# Patient Record
Sex: FEMALE | Race: WHITE | Employment: OTHER | URBAN - NONMETROPOLITAN AREA
[De-identification: names, ages, dates, MRNs, and addresses within clinical notes are randomized per-mention and may not be internally consistent; named-entity substitution may affect disease eponyms.]

---

## 2017-09-11 PROBLEM — I65.22 STENOSIS OF LEFT CAROTID ARTERY: Status: ACTIVE | Noted: 2017-09-11

## 2017-09-11 PROBLEM — Z72.0 TOBACCO ABUSE: Status: ACTIVE | Noted: 2017-09-11

## 2017-09-11 PROBLEM — I10 ESSENTIAL HYPERTENSION: Status: ACTIVE | Noted: 2017-09-11

## 2017-10-19 PROBLEM — Z98.890 S/P CAROTID ENDARTERECTOMY: Status: ACTIVE | Noted: 2017-10-19

## 2022-11-09 ENCOUNTER — HOSPITAL ENCOUNTER (OUTPATIENT)
Dept: PHYSICAL THERAPY | Age: 77
Setting detail: THERAPIES SERIES
Discharge: HOME | End: 2022-11-09
Payer: MEDICARE

## 2022-11-09 PROCEDURE — 97162 PT EVAL MOD COMPLEX 30 MIN: CPT

## 2022-11-09 NOTE — PROGRESS NOTES
** PLEASE SIGN, DATE AND TIME CERTIFICATION BELOW AND RETURN TO [facility]PAT[facility]ILITATION (FAX #: [phone]). ATTEST/CO-SIGN IF ACCESSING VIA [email or web address]. THANK YOU.**    I certify that I have examined the patient below and determined that Physical Medicine and Rehabilitation service is necessary and that I approve the established plan of care for up to 90 days or as specifically noted. Attestation, signature or co-signature of physician indicates approval of certification requirements.    ________________________ ____________ __________  Physician Signature   Date   Time  [address] [facility]  PHYSICAL THERAPY  [x] EVALUATION  [] DAILY NOTE (LAND) [] DAILY NOTE (AQUATIC ) [] PROGRESS NOTE [] DISCHARGE NOTE    [] [facility]   [] [facility] 90    [] [facility]   [x] Nusrat Kenneth    Date: 2022  Patient Name:  Brown Miller  : 1945  MRN: [id number]    Referring Practitioner Minerva Cisneros DO   Diagnosis Other spondylosis with myelopathy, cervical region [M47.12]    Treatment Diagnosis Cervical dysfunction    Date of Evaluation 22    Additional Pertinent History Diabetes, HBP,       Functional Outcome Measure Used Oswestry NDI   Functional Outcome Score 6 (22)       Insurance: Primary: Payor: Adryan Bachelor /  /  / ,   Secondary: HUMANA   Authorization Information: None    Visit # 1, 1/10 for progress note   Visits Allowed: Med nec   Recertification Date:    Physician Follow-Up: Dec 14, 2022    Physician Orders: Jerson Palmer and treat    History of Present Illness: Pain in the neck and for more than a year,  was progressively worsening--Surgery Junky 2022-- fusion due to 2 bulging discs      SUBJECTIVE: came 15 min late. Thought appt at 11:30     Social/Functional History and Current Status:  Medications and Allergies have been reviewed and are listed on Medical History Questionnaire.     Brown Miller lives with spouse in a multiple floor home with stairs and no handrail to enter. Prior to their onset of neck  issues, patient was I in all ADL's and functional activities   she had a lot of pain  Currently patient reports difficulty with lifting heavy weights. Patient is currently Retired  Still doing doing paperwork for [employer] OBJECTIVE:    Pain: Achy in right upper scap  and SO  better after surgery.         Observation/Posture Forward  shoulders, forward head     Scar well healed         Range of Motion CERVICAL RANGE OF MOTION  (% of normal )      Flexion 60    Extension 10   Rotation Right 50   Rotation Left 50   Sidebending Right 20   Sidebending Left 20           Cervical Range of Motion is [facility]  []         Strength Shoulder flex 4B  Abd  4- R, 4 L   Biceps 5 B  Triceps 5 B  Thumb add 4 B    Palpation    Sensation Normal to light touch                    Tip prehesion  More difficult with right hand occasionslly drops things   Special Tests    Reflexes  Biceps- slight B   Triceps- slight B   Brachioradialis- unable to elicit      TREATMENT   Precautions:    Pain:     X in shaded column indicates activity completed today   Modalities Parameters/  Location  Notes                     Manual Therapy Time/Technique  Notes                     Exercise/Intervention   Notes          Chin tucks, shoulder rolls   x Discussed posture                                                                     Specific Interventions Next Treatment: exercise, mainly stabs,  posture, modalities or manual therapy if pain increases     Activity/Treatment Tolerance:  [x]  Patient tolerated treatment well  []  Patient limited by fatigue  []  Patient limited by pain   []  Patient limited by medical complications  []  Other:     Assessment: mild weakness, major postural faults, and posture decreased   Body Structures/Functions/Activity Limitations: impaired endurance, impaired ROM, impaired strength, and abnormal posture  Prognosis: good    GOALS:  Patient Goal: less stiffness, better motin. Short and Long Term Goals:  Time Frame: 5 weeks   Decrease pain to less than a 2   Increase CROM to 50% of normal, except ext 10%   Increase strength of the both shoulders and scap muscle to 5/5   Improve posture   I HEP       Patient Education:   [x]  HEP/Education Completed: Plan of Care, Goals, posture  Medbridge Access Code:  []  No new Education completed  []  Reviewed Prior HEP      []  Patient verbalized and/or demonstrated understanding of education provided. []  Patient unable to verbalize and/or demonstrate understanding of education provided. Will continue education. []  Barriers to learning: non    PLAN:  Treatment Recommendations: Strengthening, Range of Motion, Manual Therapy - Soft Tissue Mobilization, Manual Therapy - Joint Manipulation, Pain Management, Home Exercise Program, Patient Education, Positioning, and Modalities    [x]  Plan of care initiated. Plan to see patient 2 times per week for 5 weeks to address the treatment planned outlined above.   []  Continue with current plan of care  []  Modify plan of care as follows:    []  Hold pending physician visit  []  Discharge    Time In 1125   Time Out 1200   Timed Code Minutes: 0 min   Total Treatment Time: 35 min       Electronically Signed by: Juan Diego Scott PT

## 2022-11-16 ENCOUNTER — HOSPITAL ENCOUNTER (OUTPATIENT)
Dept: PHYSICAL THERAPY | Age: 77
Setting detail: THERAPIES SERIES
Discharge: HOME | End: 2022-11-16
Payer: MEDICARE

## 2022-11-16 PROCEDURE — 97110 THERAPEUTIC EXERCISES: CPT

## 2022-11-16 PROCEDURE — 97140 MANUAL THERAPY 1/> REGIONS: CPT

## 2022-11-16 NOTE — PROGRESS NOTES
[address] [facility]  PHYSICAL THERAPY  [] EVALUATION  [x] DAILY NOTE (LAND) [] DAILY NOTE (AQUATIC ) [] PROGRESS NOTE [] DISCHARGE NOTE    [] [address] [place] [address]   [] [place] [address]    [] [facility]   [x] Princess Apo    Date: 2022  Patient Name:  Jess Avitia  : 1945  MRN: [id number]    Referring Practitioner Eliazar Lew DO   Diagnosis Other spondylosis with myelopathy, cervical region [M47.12]    Treatment Diagnosis Cervical dysfunction    Date of Evaluation 22    Additional Pertinent History Diabetes, HBP,       Functional Outcome Measure Used Oswestry NDI   Functional Outcome Score 6 (22)       Insurance: Primary: Payor: Antoine Mendoza /  /  / ,   Secondary: HUMANA   Authorization Information: None    Visit # 2, 2/10 for progress note   Visits Allowed: Med nec   Recertification Date: 982   Physician Follow-Up: Dec 14, 2022    Physician Orders: Anmol Mayfield and treat    History of Present Illness: Pain in the neck and for more than a year,  was progressively worsening--Surgery Junky 2022-- fusion due to 2 bulging discs      SUBJECTIVE: Pt stated she knows her posture is bad and tends to sit looking down at tablet. TREATMENT   Precautions:    Pain: 3/10 cervical  spine    X in shaded column indicates activity completed today   Modalities Parameters/  Location  Notes                     Manual Therapy Time/Technique  Notes   STM to B upper traps, paraspinals, rhomboids 10 min. x                Exercise/Intervention   Notes   Cervical rotation with towel 10sec.   3x x    Chin tucks, shoulder rolls 10x  x Discussed posture    Cervical isometrics 10x  x    Shoulder extension, shoulder retraction, horizontal abduction, ER 10x  x           Posture at wall then walking away while maintaining  25 ft.  2x x                                         Specific Interventions Next Treatment: exercise, mainly stabs,  posture, modalities or manual therapy if pain increases     Activity/Treatment Tolerance:  [x]  Patient tolerated treatment well  []  Patient limited by fatigue  []  Patient limited by pain   []  Patient limited by medical complications  []  Other:     Assessment: Initiated therapeutic ex program this date with focus on postural awareness. Pt sensitive in cervical spine near healed incisions. Pt educated in proper sitting when on her tablet adding pillows to lap to decrease cervical flexion. Body Structures/Functions/Activity Limitations: impaired endurance, impaired ROM, impaired strength, and abnormal posture  Prognosis: good    GOALS:  Patient Goal: less stiffness, better motin. Short and Long Term Goals:  Time Frame: 5 weeks   Decrease pain to less than a 2   Increase CROM to 50% of normal, except ext 10%   Increase strength of the both shoulders and scap muscle to 5/5   Improve posture   I HEP       Patient Education:   [x]  HEP/Education Completed: Plan of Care, Goals, posture, cervical isometrics  DOZ Access Code:  []  No new Education completed  []  Reviewed Prior HEP      [x]  Patient verbalized and/or demonstrated understanding of education provided. []  Patient unable to verbalize and/or demonstrate understanding of education provided. Will continue education. []  Barriers to learning: non    PLAN:  Treatment Recommendations: Strengthening, Range of Motion, Manual Therapy - Soft Tissue Mobilization, Manual Therapy - Joint Manipulation, Pain Management, Home Exercise Program, Patient Education, Positioning, and Modalities    []  Plan of care initiated. Plan to see patient 2 times per week for 5 weeks to address the treatment planned outlined above.   [x]  Continue with current plan of care  []  Modify plan of care as follows:    []  Hold pending physician visit  []  Discharge    Time In 1445   Time Out 1515   Timed Code Minutes: 30 min   Total Treatment Time: 30 min       Electronically Signed by: Carmen Ramos, PTA

## 2022-11-18 ENCOUNTER — HOSPITAL ENCOUNTER (OUTPATIENT)
Dept: PHYSICAL THERAPY | Age: 77
Setting detail: THERAPIES SERIES
Discharge: HOME | End: 2022-11-18
Payer: MEDICARE

## 2022-11-18 PROCEDURE — 97140 MANUAL THERAPY 1/> REGIONS: CPT

## 2022-11-18 PROCEDURE — 97110 THERAPEUTIC EXERCISES: CPT

## 2022-11-18 NOTE — PROGRESS NOTES
7[address] [facility]  PHYSICAL THERAPY  [] EVALUATION  [x] DAILY NOTE (LAND) [] DAILY NOTE (AQUATIC ) [] PROGRESS NOTE [] DISCHARGE NOTE    [] [address] [place] [address]   [] [place] [address]    [] [facility]   [x] Marcial Show    Date: 2022  Patient Name:  Shara Sorenson  : 1945  MRN: [id number]    Referring Practitioner Russell Aguilar DO   Diagnosis Other spondylosis with myelopathy, cervical region [M47.12]    Treatment Diagnosis Cervical dysfunction    Date of Evaluation 22    Additional Pertinent History Diabetes, HBP,       Functional Outcome Measure Used Oswestry NDI   Functional Outcome Score 6 (22)       Insurance: Primary: Payor: Sarah Iglesias /  /  / ,   Secondary: HUMANA   Authorization Information: None    Visit # 3, 3/10 for progress note   Visits Allowed: Med nec   Recertification Date: 1844   Physician Follow-Up: Dec 14, 2022    Physician Orders: Orlin Nicholson and treat    History of Present Illness: Pain in the neck and for more than a year,  was progressively worsening--Surgery Junky 2022-- fusion due to 2 bulging discs      SUBJECTIVE: Pt stated she felt better after last session. Neck had no increase in pain. Today though pt is having stomach pains and neck is very achy. TREATMENT   Precautions:    Pain: 4/10 cervical  spine    X in shaded column indicates activity completed today   Modalities Parameters/  Location  Notes                     Manual Therapy Time/Technique  Notes   STM to B upper traps, paraspinals, rhomboids 10 min. x                Exercise/Intervention   Notes   Cervical rotation with towel 10sec. 3x x    Chin tucks, shoulder rolls 10x  x Discussed posture    Cervical isometrics 10x  x    Shoulder extension, shoulder retraction, horizontal abduction, ER 10x orange x    UBE retro 2min.   x    Posture at wall then walking away while maintaining  25 ft.  2x x Specific Interventions Next Treatment: exercise, mainly stabs,  posture, modalities or manual therapy if pain increases     Activity/Treatment Tolerance:  [x]  Patient tolerated treatment well  []  Patient limited by fatigue  []  Patient limited by pain   []  Patient limited by medical complications  []  Other:     Assessment: Continued with postural strengthening exercises. Pain levels higher today in cervical region so continued with STM. Added UBE this date and patient was able to maintain posture during this activity. Body Structures/Functions/Activity Limitations: impaired endurance, impaired ROM, impaired strength, and abnormal posture  Prognosis: good    GOALS:  Patient Goal: less stiffness, better motin. Short and Long Term Goals:  Time Frame: 5 weeks   Decrease pain to less than a 2   Increase CROM to 50% of normal, except ext 10%   Increase strength of the both shoulders and scap muscle to 5/5   Improve posture   I HEP       Patient Education:   []  HEP/Education Completed: Plan of Care, Goals, posture, cervical isometrics  MedOpez Access Code:  []  No new Education completed  [x]  Reviewed Prior HEP      [x]  Patient verbalized and/or demonstrated understanding of education provided. []  Patient unable to verbalize and/or demonstrate understanding of education provided. Will continue education. []  Barriers to learning: non    PLAN:  Treatment Recommendations: Strengthening, Range of Motion, Manual Therapy - Soft Tissue Mobilization, Manual Therapy - Joint Manipulation, Pain Management, Home Exercise Program, Patient Education, Positioning, and Modalities    []  Plan of care initiated. Plan to see patient 2 times per week for 5 weeks to address the treatment planned outlined above.   [x]  Continue with current plan of care  []  Modify plan of care as follows:    []  Hold pending physician visit  []  Discharge    Time In 0950   Time Out 1020   Timed Code Minutes: 30 min   Total Treatment Time: 30 min       Electronically Signed by: Bang Cornejo PTA

## 2022-11-21 ENCOUNTER — HOSPITAL ENCOUNTER (OUTPATIENT)
Dept: PHYSICAL THERAPY | Age: 77
Setting detail: THERAPIES SERIES
Discharge: HOME | End: 2022-11-21
Payer: MEDICARE

## 2022-11-21 PROCEDURE — 97110 THERAPEUTIC EXERCISES: CPT

## 2022-11-21 PROCEDURE — 97140 MANUAL THERAPY 1/> REGIONS: CPT

## 2022-11-21 NOTE — PROGRESS NOTES
[facility]  [] EVALUATION  [x] DAILY NOTE (LAND) [] DAILY NOTE (AQUATIC ) [] PROGRESS NOTE [] DISCHARGE NOTE    [] [address] [place] [address]   [] [place] [address]    [] [facility]   [x] [facility]    Date: 2022  Patient Name:  Deisi Magana  : 1945  MRN: [id number]    Referring Practitioner Teresa Oleary DO   Diagnosis Other spondylosis with myelopathy, cervical region [M47.12]    Treatment Diagnosis Cervical dysfunction    Date of Evaluation 22    Additional Pertinent History Diabetes, HBP,       Functional Outcome Measure Used Oswestry NDI   Functional Outcome Score 6 (22)       Insurance: Primary: Payor: Ariane Wolf /  /  / ,   Secondary: HUMANA   Authorization Information: None    Visit # 4, 4/10 for progress note   Visits Allowed: Med nec   Recertification Date: 895   Physician Follow-Up: Dec 14, 2022    Physician Orders: Relda Bence and treat    History of Present Illness: Pain in the neck and for more than a year,  was progressively worsening--Surgery Patriciolane Ramirez 2022-- fusion due to 2 bulging discs      SUBJECTIVE: Pt stated her neck is \"creaking and cracking today\" Pain levels are a little better after therapy. TREATMENT   Precautions:    Pain: 4/10 cervical  spine    X in shaded column indicates activity completed today   Modalities Parameters/  Location  Notes                     Manual Therapy Time/Technique  Notes   STM to B upper traps, paraspinals, rhomboids 10 min. x                Exercise/Intervention   Notes   Cervical rotation with towel 5sec. 5x x    Chin tucks, shoulder rolls 10x  x Discussed posture    Cervical isometrics 10x  x    Shoulder extension, shoulder retraction, horizontal abduction, ER 15x orange x    UBE retro 2min.   x    Posture at wall then walking away while maintaining  25 ft.  2x            Cervical stabs at wall NO BALL: shoulder flexion, scaption 10x x                           Specific Interventions Next Treatment: exercise, mainly stabs,  posture, modalities or manual therapy if pain increases     Activity/Treatment Tolerance:  [x]  Patient tolerated treatment well  []  Patient limited by fatigue  []  Patient limited by pain   []  Patient limited by medical complications  []  Other:     Assessment: Added cervical stabs to routine this date. Pt stated she is able to sleep better at night since starting therapy with less numbness in UE's. Pt continues to work on posture     Body Structures/Functions/Activity Limitations: impaired endurance, impaired ROM, impaired strength, and abnormal posture  Prognosis: good    GOALS:  Patient Goal: less stiffness, better motin. Short and Long Term Goals:  Time Frame: 5 weeks   Decrease pain to less than a 2   Increase CROM to 50% of normal, except ext 10%   Increase strength of the both shoulders and scap muscle to 5/5   Improve posture   I HEP       Patient Education:   []  HEP/Education Completed: Plan of Care, Goals, posture, cervical isometrics, shoulder extension, retraction, horizontal abduction  Boundless Access Code: [email or web address]    []  No new Education completed  [x]  Reviewed Prior HEP      [x]  Patient verbalized and/or demonstrated understanding of education provided. []  Patient unable to verbalize and/or demonstrate understanding of education provided. Will continue education. []  Barriers to learning: non    PLAN:  Treatment Recommendations: Strengthening, Range of Motion, Manual Therapy - Soft Tissue Mobilization, Manual Therapy - Joint Manipulation, Pain Management, Home Exercise Program, Patient Education, Positioning, and Modalities    []  Plan of care initiated. Plan to see patient 2 times per week for 5 weeks to address the treatment planned outlined above.   [x]  Continue with current plan of care  []  Modify plan of care as follows:    []  Hold pending physician visit  []  Discharge    Time In 1420   Time Out 1450   Timed Code Minutes: 30 min   Total Treatment Time: 30 min       Electronically Signed by: Sen Borjas PTA

## 2022-11-28 ENCOUNTER — HOSPITAL ENCOUNTER (OUTPATIENT)
Dept: PHYSICAL THERAPY | Age: 77
Setting detail: THERAPIES SERIES
Discharge: HOME | End: 2022-11-28
Payer: MEDICARE

## 2022-11-28 PROCEDURE — 97110 THERAPEUTIC EXERCISES: CPT

## 2022-11-28 PROCEDURE — 97140 MANUAL THERAPY 1/> REGIONS: CPT

## 2022-11-28 NOTE — PROGRESS NOTES
[address] [facility]  PHYSICAL THERAPY  [] EVALUATION  [x] DAILY NOTE (LAND) [] DAILY NOTE (AQUATIC ) [] PROGRESS NOTE [] DISCHARGE NOTE    [] [facility]   [] [place] [address]    [] [facility]   [x] Laura Ibarra    Date: 2022  Patient Name:  Earnestine Coleman  : 1945  MRN: [id number]    Referring Practitioner Sophia Shaw DO   Diagnosis Other spondylosis with myelopathy, cervical region [M47.12]    Treatment Diagnosis Cervical dysfunction    Date of Evaluation 22    Additional Pertinent History Diabetes, HBP,       Functional Outcome Measure Used Oswestry NDI   Functional Outcome Score 6 (22)       Insurance: Primary: Payor: Kelley Muir /  /  / ,   Secondary: HUMANA   Authorization Information: None    Visit # 5, 510 for progress note   Visits Allowed: Med nec   Recertification Date: 4499   Physician Follow-Up: Dec 14, 2022    Physician Orders: Samantha Adams and treat    History of Present Illness: Pain in the neck and for more than a year,  was progressively worsening--Surgery Junky 2022-- fusion due to 2 bulging discs      SUBJECTIVE: Pt stated her low back is bothering her most today. TREATMENT   Precautions:    Pain: 4-5/10 L side cervical  spine, 7-8/10 low back    X in shaded column indicates activity completed today   Modalities Parameters/  Location  Notes                     Manual Therapy Time/Technique  Notes   STM to B upper traps, paraspinals, rhomboids 10 min. x                Exercise/Intervention   Notes   Cervical rotation with towel 5sec. 5x x    Chin tucks, shoulder rolls 15x  x Discussed posture    Cervical isometrics 10x      Shoulder extension, shoulder retraction, horizontal abduction, ER 15x orange x    UBE retro 2min.   x    Posture at wall then walking away while maintaining  25 ft.  2x            Cervical stabs at wall NO BALL: shoulder flexion, scaption 15x  x Specific Interventions Next Treatment: exercise, mainly stabs,  posture, modalities or manual therapy if pain increases     Activity/Treatment Tolerance:  [x]  Patient tolerated treatment well  []  Patient limited by fatigue  []  Patient limited by pain   []  Patient limited by medical complications  []  Other:     Assessment: No progressions made this date due to increase in low back pain. Pt had difficulty standing up straight due to pain. Body Structures/Functions/Activity Limitations: impaired endurance, impaired ROM, impaired strength, and abnormal posture  Prognosis: good    GOALS:  Patient Goal: less stiffness, better motin. Short and Long Term Goals:  Time Frame: 5 weeks   Decrease pain to less than a 2   Increase CROM to 50% of normal, except ext 10%   Increase strength of the both shoulders and scap muscle to 5/5   Improve posture   I HEP       Patient Education:   []  HEP/Education Completed: Plan of Care, Goals, posture, cervical isometrics, shoulder extension, retraction, horizontal abduction  Techtium Access Code: [email or web address]    []  No new Education completed  [x]  Reviewed Prior HEP      [x]  Patient verbalized and/or demonstrated understanding of education provided. []  Patient unable to verbalize and/or demonstrate understanding of education provided. Will continue education. []  Barriers to learning: non    PLAN:  Treatment Recommendations: Strengthening, Range of Motion, Manual Therapy - Soft Tissue Mobilization, Manual Therapy - Joint Manipulation, Pain Management, Home Exercise Program, Patient Education, Positioning, and Modalities    []  Plan of care initiated. Plan to see patient 2 times per week for 5 weeks to address the treatment planned outlined above.   [x]  Continue with current plan of care  []  Modify plan of care as follows:    []  Hold pending physician visit  []  Discharge    Time In 1100   Time Out 1130   Timed Code Minutes: 30 min   Total Treatment Time: 30 min Electronically Signed by: Janis Frias, PTA

## 2022-12-02 ENCOUNTER — HOSPITAL ENCOUNTER (OUTPATIENT)
Dept: PHYSICAL THERAPY | Age: 77
Setting detail: THERAPIES SERIES
Discharge: HOME | End: 2022-12-02
Payer: MEDICARE

## 2022-12-02 PROCEDURE — 97110 THERAPEUTIC EXERCISES: CPT

## 2022-12-02 PROCEDURE — 97140 MANUAL THERAPY 1/> REGIONS: CPT

## 2022-12-05 ENCOUNTER — HOSPITAL ENCOUNTER (OUTPATIENT)
Dept: PHYSICAL THERAPY | Age: 77
Setting detail: THERAPIES SERIES
Discharge: HOME | End: 2022-12-05
Payer: MEDICARE

## 2022-12-05 PROCEDURE — 97110 THERAPEUTIC EXERCISES: CPT

## 2022-12-05 PROCEDURE — 97140 MANUAL THERAPY 1/> REGIONS: CPT

## 2022-12-05 NOTE — PROGRESS NOTES
[address] [facility]  PHYSICAL THERAPY  [] EVALUATION  [] DAILY NOTE (LAND) [] DAILY NOTE (AQUATIC ) [x] PROGRESS NOTE [] DISCHARGE NOTE    [] [facility] [place]   [] [place] [address]    [] [facility]   [x] Irwin Cotto    Date: 2022  Patient Name:  Дмитрий Montero  : 1945  MRN: [id number]    Referring Practitioner Renetta Rodriguez DO   Diagnosis Other spondylosis with myelopathy, cervical region [M47.12]    Treatment Diagnosis Cervical dysfunction    Date of Evaluation 22    Additional Pertinent History Diabetes, HBP,       Functional Outcome Measure Used Oswestry NDI   Functional Outcome Score 6 (22)       Insurance: Primary: Payor: MEDICARE /  /  / ,   Secondary: HUMANA   Authorization Information: None    Visit # 7 0/10 for progress note   Visits Allowed: Med nec   Recertification Date:    Physician Follow-Up: Dec 14, 2022    Physician Orders: Miguel Angel Ruiz and treat    History of Present Illness: Pain in the neck and for more than a year,  was progressively worsening--Surgery 2022-- fusion due to 2 bulging discs  using bone stimulator -- supposed to use 4 hours a day. SUBJECTIVE: stomach remains upset. she called  today   may change meds. TREATMENT   Precautions:    Pain: 1-2/10 L side cervical  spine     X in shaded column indicates activity completed today   Modalities Parameters/  Location  Notes                     Manual Therapy Time/Technique  Notes   STM with myofacial release to B upper traps, paraspinals rhomboids 10 min. x                Exercise/Intervention   Notes   Cervical rotation with towel 5sec. 5x x    Chin tucks, shoulder rolls with 1# weights 15x  x Discussed posture    Cervical isometrics 10x      Shoulder extension, shoulder retraction, horizontal abduction, ER 15x orange x    UBE retro/forward  2 min, then 1.5 min 3.5min.   x    Posture at wall then walking away while maintaining  25 ft.  2x            Cervical stabs no wall NO BALL: shoulder flexion, scaption, above head press, abd  10x  10x 1# x 10x with overhead press                 Discussed pillow use to  hold bone stimulator to decrease neck strain. x      Specific Interventions Next Treatment: exercise, mainly stabs,  posture, modalities or manual therapy if pain increases     Activity/Treatment Tolerance:  [x]  Patient tolerated treatment well  []  Patient limited by fatigue  [x]  Patient limited by pain   []  Patient limited by medical complications  []  Other:     Assessment:  Pt continues to work on posture corrections in standing. Difficulty with complete upright posture when performing UE strengthening ex. Added 1# weights this date. Continued with manual therapy to relax neck musculature      Body Structures/Functions/Activity Limitations: impaired endurance, impaired ROM, impaired strength, and abnormal posture  Prognosis: good    GOALS:  Patient Goal: less stiffness, better motin. Short and Long Term Goals:  Time Frame: 5 weeks   Decrease pain to less than a 2 -- MET new goal  -- pain less than 1/10 after exercise. Increase CROM to 50% of normal, except ext 10%   MET for SB and rotation, not met for ext   Increase strength of the both shoulders and scap muscle to 5/5 Partially met-- still at 4/5   Improve posture  NOT MET  still remains forward head and rounded shoulders, cont goal   I HEP  NOT MET cont       Patient Education:   []  HEP/Education Completed: Plan of Care, Goals, posture, cervical isometrics, shoulder extension, retraction, horizontal abduction  [facility] Access Code: [email or web address]    []  No new Education completed  [x]  Reviewed Prior HEP      [x]  Patient verbalized and/or demonstrated understanding of education provided. []  Patient unable to verbalize and/or demonstrate understanding of education provided. Will continue education.   []  Barriers to learning: non    PLAN:  Treatment Recommendations: Strengthening, Range of Motion, Manual Therapy - Soft Tissue Mobilization, Manual Therapy - Joint Manipulation, Pain Management, Home Exercise Program, Patient Education, Positioning, and Modalities    []  Plan of care initiated. Plan to see patient 2 times per week for 5 weeks to address the treatment planned outlined above. [x]  Continue with current plan of care 2 times for 3-4 weeks.   []  Modify plan of care as follows:    []  Hold pending physician visit  []  Discharge    Time In 1300   Time Out 1340   Timed Code Minutes: 40 min   Total Treatment Time: 40 min       Electronically Signed by: Caesar Quiñones, PT

## 2022-12-12 ENCOUNTER — HOSPITAL ENCOUNTER (OUTPATIENT)
Dept: PHYSICAL THERAPY | Age: 77
Setting detail: THERAPIES SERIES
End: 2022-12-12
Payer: MEDICARE

## 2022-12-16 ENCOUNTER — HOSPITAL ENCOUNTER (OUTPATIENT)
Dept: PHYSICAL THERAPY | Age: 77
Setting detail: THERAPIES SERIES
Discharge: HOME | End: 2022-12-16
Payer: MEDICARE

## 2022-12-16 PROCEDURE — 97110 THERAPEUTIC EXERCISES: CPT

## 2022-12-16 PROCEDURE — 97140 MANUAL THERAPY 1/> REGIONS: CPT

## 2022-12-16 NOTE — PROGRESS NOTES
[address] [facility]  PHYSICAL THERAPY  [] EVALUATION  [x] DAILY NOTE (LAND) [] DAILY NOTE (AQUATIC ) [] PROGRESS NOTE [] DISCHARGE NOTE    [] [address] [facility]   [] [place] [address]    [] [facility]   [x] Jeremy Melissa    Date: 2022  Patient Name:  Jeannie Agustin  : 1945  MRN: [id number]    Referring Practitioner Mirian Jose DO   Diagnosis Other spondylosis with myelopathy, cervical region [M47.12]    Treatment Diagnosis Cervical dysfunction    Date of Evaluation 22    Additional Pertinent History Diabetes, HBP,       Functional Outcome Measure Used Oswestry NDI   Functional Outcome Score 6 (22)       Insurance: Primary: Payor: Pee Gilbert /  /  / ,   Secondary: HUMANA   Authorization Information: None    Visit # 8,  1/10 for progress note   Visits Allowed: Med nec   Recertification Date: [id number]   Physician Follow-Up: Dec 14, 2022    Physician Orders: Michael Marx and treat    History of Present Illness: Pain in the neck and for more than a year,  was progressively worsening--Surgery 2022-- fusion due to 2 bulging discs  using bone stimulator -- supposed to use 4 hours a day. SUBJECTIVE: Pt stated her neck has been feeling better with less stiffness. Pt having to take new medicine due to triglycerides being high. TREATMENT   Precautions:    Pain: 1-2/10 L side cervical  spine     X in shaded column indicates activity completed today   Modalities Parameters/  Location  Notes                     Manual Therapy Time/Technique  Notes   STM with myofacial release to B upper traps, paraspinals rhomboids 10 min. x                Exercise/Intervention   Notes   Cervical rotation with towel 5sec. 5x x    Chin tucks, shoulder rolls with 1# weights 15x  x Discussed posture    Cervical isometrics 10x      Shoulder extension, shoulder retraction, horizontal abduction, ER 15x lime x    UBE retro/forward   3.0 min.   x Retro only   Posture at wall then walking away while maintaining  25 ft.  2x            Cervical stabs no wall NO BALL: shoulder flexion, scaption, above head press 10x 1# x                  Discussed pillow use to  hold bone stimulator to decrease neck strain. x      Specific Interventions Next Treatment: exercise, mainly stabs,  posture, modalities or manual therapy if pain increases     Activity/Treatment Tolerance:  [x]  Patient tolerated treatment well  []  Patient limited by fatigue  []  Patient limited by pain   []  Patient limited by medical complications  []  Other:     Assessment:  Pt having less pain in cervical region. Progressed pt with increasing resistance level with therapy bands. Less cues for upright posture given. Body Structures/Functions/Activity Limitations: impaired endurance, impaired ROM, impaired strength, and abnormal posture  Prognosis: good    GOALS:  Patient Goal: less stiffness, better motin. Short and Long Term Goals:  Time Frame: 5 weeks   Decrease pain to less than a 2 -- MET new goal  -- pain less than 1/10 after exercise. Increase CROM to 50% of normal, except ext 10%   MET for SB and rotation, not met for ext   Increase strength of the both shoulders and scap muscle to 5/5 Partially met-- still at 4/5   Improve posture  NOT MET  still remains forward head and rounded shoulders, cont goal   I HEP  NOT MET cont       Patient Education:   []  HEP/Education Completed: Plan of Care, Goals, posture, cervical isometrics, shoulder extension, retraction, horizontal abduction  [employer] Access Code: [email or web address]    []  No new Education completed  [x]  Reviewed Prior HEP      [x]  Patient verbalized and/or demonstrated understanding of education provided. []  Patient unable to verbalize and/or demonstrate understanding of education provided. Will continue education.   []  Barriers to learning: non    PLAN:  Treatment Recommendations: Strengthening, Range of Motion, Manual Therapy - Soft Tissue Mobilization, Manual Therapy - Joint Manipulation, Pain Management, Home Exercise Program, Patient Education, Positioning, and Modalities    []  Plan of care initiated. Plan to see patient 2 times per week for 5 weeks to address the treatment planned outlined above. [x]  Continue with current plan of care 2 times for 3-4 weeks.   []  Modify plan of care as follows:    []  Hold pending physician visit  []  Discharge    Time In 1130   Time Out 1200   Timed Code Minutes: 30 min   Total Treatment Time: 30 min       Electronically Signed by: Carmen Nassar PTA

## 2022-12-19 ENCOUNTER — HOSPITAL ENCOUNTER (OUTPATIENT)
Dept: PHYSICAL THERAPY | Age: 77
Setting detail: THERAPIES SERIES
Discharge: HOME | End: 2022-12-19
Payer: MEDICARE

## 2022-12-19 PROCEDURE — 97110 THERAPEUTIC EXERCISES: CPT

## 2022-12-19 PROCEDURE — 97140 MANUAL THERAPY 1/> REGIONS: CPT

## 2022-12-19 NOTE — PROGRESS NOTES
[address] [facility]  PHYSICAL THERAPY  [] EVALUATION  [x] DAILY NOTE (LAND) [] DAILY NOTE (AQUATIC ) [] PROGRESS NOTE [] DISCHARGE NOTE    [] [facility]   [] [place] [address]    [] [facility]   [x] Josh Marie    Date: 2022  Patient Name:  Joseph Ellis  : 1945  MRN: [id number]    Referring Practitioner Salinas Riggs DO   Diagnosis Other spondylosis with myelopathy, cervical region [M47.12]    Treatment Diagnosis Cervical dysfunction    Date of Evaluation 22    Additional Pertinent History Diabetes, HBP,       Functional Outcome Measure Used Oswestry NDI   Functional Outcome Score 6 (22)       Insurance: Primary: Payor: Robel Barros /  /  / ,   Secondary: HUMANA   Authorization Information: None    Visit # 9,  10 for progress note   Visits Allowed: Med nec   Recertification Date: [id number]   Physician Follow-Up: Dec 14, 2022    Physician Orders: Morenita Hoover and treat    History of Present Illness: Pain in the neck and for more than a year,  was progressively worsening--Surgery 2022-- fusion due to 2 bulging discs  using bone stimulator -- supposed to use 4 hours a day. SUBJECTIVE: Pt stated she had a little muscle soreness after last session. TREATMENT   Precautions:    Pain: 3/10 cervical    X in shaded column indicates activity completed today   Modalities Parameters/  Location  Notes                     Manual Therapy Time/Technique  Notes   STM with myofacial release to B upper traps, paraspinals rhomboids 10 min. x                Exercise/Intervention   Notes   Cervical rotation with towel 5sec.   5x x    Chin tucks, shoulder rolls with 1# weights 15x  x Discussed posture    Cervical isometrics 10x      Shoulder extension, shoulder retraction, horizontal abduction, ER 15x lime x    UBE retro/forward   3.0 min.  x Retro only   Posture at wall then walking away while maintaining  25 ft.  2x Cervical stabs no wall NO BALL: shoulder flexion, scaption, above head press 15x 1# x    Corner stretch 15 sec 3x x           Discussed pillow use to  hold bone stimulator to decrease neck strain. Specific Interventions Next Treatment: exercise, mainly stabs,  posture, modalities or manual therapy if pain increases     Activity/Treatment Tolerance:  [x]  Patient tolerated treatment well  []  Patient limited by fatigue  []  Patient limited by pain   []  Patient limited by medical complications  []  Other:     Assessment:  Added corner stretch this date to decrease tightness in chest. Continued with posture corrections and soft tissue work to assist with cervical mobility. Body Structures/Functions/Activity Limitations: impaired endurance, impaired ROM, impaired strength, and abnormal posture  Prognosis: good    GOALS:  Patient Goal: less stiffness, better motin. Short and Long Term Goals:  Time Frame: 5 weeks   Decrease pain to less than a 2 -- MET new goal  -- pain less than 1/10 after exercise. Increase CROM to 50% of normal, except ext 10%   MET for SB and rotation, not met for ext   Increase strength of the both shoulders and scap muscle to 5/5 Partially met-- still at 4/5   Improve posture  NOT MET  still remains forward head and rounded shoulders, cont goal   I HEP  NOT MET cont       Patient Education:   []  HEP/Education Completed: Plan of Care, Goals, posture, cervical isometrics, shoulder extension, retraction, horizontal abduction  [employer] Access Code: [email or web address]    []  No new Education completed  [x]  Reviewed Prior HEP      [x]  Patient verbalized and/or demonstrated understanding of education provided. []  Patient unable to verbalize and/or demonstrate understanding of education provided. Will continue education.   []  Barriers to learning: non    PLAN:  Treatment Recommendations: Strengthening, Range of Motion, Manual Therapy - Soft Tissue Mobilization, Manual Therapy - Joint Manipulation, Pain Management, Home Exercise Program, Patient Education, Positioning, and Modalities    []  Plan of care initiated. Plan to see patient 2 times per week for 5 weeks to address the treatment planned outlined above. [x]  Continue with current plan of care 2 times for 3-4 weeks.   []  Modify plan of care as follows:    []  Hold pending physician visit  []  Discharge    Time In 1345   Time Out 1415   Timed Code Minutes: 30 min   Total Treatment Time: 30 min       Electronically Signed by: John Alanis PTA

## 2022-12-28 ENCOUNTER — HOSPITAL ENCOUNTER (OUTPATIENT)
Dept: PHYSICAL THERAPY | Age: 77
Setting detail: THERAPIES SERIES
Discharge: HOME | End: 2022-12-28
Payer: MEDICARE

## 2022-12-28 PROCEDURE — 97110 THERAPEUTIC EXERCISES: CPT

## 2022-12-28 PROCEDURE — 97140 MANUAL THERAPY 1/> REGIONS: CPT

## 2022-12-28 NOTE — PROGRESS NOTES
** PLEASE SIGN, DATE AND TIME CERTIFICATION BELOW AND RETURN TO [facility] OUTPATIENT REHABILITATION (FAX #: [phone]). ATTEST/CO-SIGN IF ACCESSING VIA [employer]. THANK YOU.**    I certify that I have examined the patient below and determined that Physical Medicine and Rehabilitation service is necessary and that I approve the established plan of care for up to 90 days or as specifically noted. Attestation, signature or co-signature of physician indicates approval of certification requirements.    ________________________ ____________ __________  Physician Signature   Date   Time  [address] [facility]  PHYSICAL THERAPY  [] EVALUATION  [x] DAILY NOTE (LAND) [] DAILY NOTE (AQUATIC ) [x] PROGRESS NOTE [] DISCHARGE NOTE    [] [address] [facility]   [] [place]    [] [address] [facility]   [x] Melba Lesser    Date: 2022  Patient Name:  Mera Hooks  : 1945  MRN: [id number]    Referring Practitioner Brittaney Lindsey DO   Diagnosis Other spondylosis with myelopathy, cervical region [M47.12]    Treatment Diagnosis Cervical dysfunction    Date of Evaluation 22    Additional Pertinent History Diabetes, HBP,       Functional Outcome Measure Used Oswestry NDI   Functional Outcome Score 6 (22)       Insurance: Primary: Payor: Ria Garvey /  /  / ,   Secondary: HUMANA   Authorization Information: None    Visit # 10  3/10 for progress note   Visits Allowed: Med nec   Recertification Date: 3/50/3343   Physician Follow-Up: Dec 14, 2022    Physician Orders: Osman Oakes and treat    History of Present Illness: Pain in the neck and for more than a year,  was progressively worsening--Surgery 2022-- fusion due to 2 bulging discs  using bone stimulator -- supposed to use 4 hours a day.       SUBJECTIVE: Still using bone stimulator  she states it is uncomfortable due to the weight   encouraged to support stimulator with pillow  when using to stop strain on her neck . TREATMENT   Precautions:    Pain: 1-2/10 cervical  base of head. X in shaded column indicates activity completed today   Modalities Parameters/  Location  Notes                     Manual Therapy Time/Technique  Notes   STM with myofacial release to B upper traps, paraspinals rhomboids 10 min. x                Exercise/Intervention   Notes   Cervical rotation with towel 5sec. 5x x    Chin tucks, shoulder rolls with 1# weights 15x  x Discussed posture    Cervical isometrics 10x      Shoulder extension, shoulder retraction, horizontal abduction, ER 15x lime x Ext and ER    UBE retro/forward   3.0 min. 30 sec forward backward x Retro only   Posture at wall then walking away while maintaining  25 ft.  2x            Cervical stabs near  wall NO BALL: shoulder flexion, scaption, above head press 15x 1# x    Corner stretch 15 sec 3x x    Stand ext in ll bars   15 sec x 3   x    Discussed pillow use to  hold bone stimulator to decrease neck strain. Specific Interventions Next Treatment: exercise, mainly stabs,  posture, modalities or manual therapy if pain increases     Activity/Treatment Tolerance:  [x]  Patient tolerated treatment well  []  Patient limited by fatigue  []  Patient limited by pain   []  Patient limited by medical complications  []  Other:     Assessment:  Added corner stretch this date to decrease tightness in chest. Continued with posture corrections and soft tissue work to assist with cervical mobility. Body Structures/Functions/Activity Limitations: impaired endurance, impaired ROM, impaired strength, and abnormal posture  Prognosis: good    GOALS:  Patient Goal: less stiffness, better motin. Short and Long Term Goals:  Time Frame: 4 more weeks   Decrease pain to less than a 2 -- MET new goal  -- pain less than 1/10 after exercise.    Increase CROM to 50% of normal, except ext 10%   MET for SB and rotation, not met for ext -- cont goal   Increase strength of the both shoulders and scap muscle to 5/5 Partially met-- still at 4/5   Improve posture  NOT MET  still remains forward head and rounded shoulders, cont goal   I HEP  NOT MET cont goal       Patient Education:   []  HEP/Education Completed: Plan of Care, Goals, posture, cervical isometrics, shoulder extension, retraction, horizontal abduction  [facility] Access Code: [email or web address]    []  No new Education completed  [x]  Reviewed Prior HEP      [x]  Patient verbalized and/or demonstrated understanding of education provided. []  Patient unable to verbalize and/or demonstrate understanding of education provided. Will continue education. []  Barriers to learning: non    PLAN:  Treatment Recommendations: Strengthening, Range of Motion, Manual Therapy - Soft Tissue Mobilization, Manual Therapy - Joint Manipulation, Pain Management, Home Exercise Program, Patient Education, Positioning, and Modalities    []  Plan of care initiated. Plan to see patient 2 times per week for 5 weeks to address the treatment planned outlined above. [x]  Continue with current plan of care 2 times for 3-4 weeks.   []  Modify plan of care as follows:    []  Hold pending physician visit  []  Discharge    Time In 1350   Time Out 1430   Timed Code Minutes: 40 min   Total Treatment Time: 40 min       Electronically Signed by: Taina Esparza, PT

## 2023-01-04 ENCOUNTER — HOSPITAL ENCOUNTER (OUTPATIENT)
Dept: PHYSICAL THERAPY | Age: 78
Setting detail: THERAPIES SERIES
Discharge: HOME | End: 2023-01-04
Payer: MEDICARE

## 2023-01-04 PROCEDURE — 97110 THERAPEUTIC EXERCISES: CPT

## 2023-01-04 PROCEDURE — 97140 MANUAL THERAPY 1/> REGIONS: CPT

## 2023-01-06 ENCOUNTER — HOSPITAL ENCOUNTER (OUTPATIENT)
Dept: PHYSICAL THERAPY | Age: 78
Setting detail: THERAPIES SERIES
Discharge: HOME | End: 2023-01-06
Payer: MEDICARE

## 2023-01-06 PROCEDURE — 97140 MANUAL THERAPY 1/> REGIONS: CPT

## 2023-01-06 PROCEDURE — 97110 THERAPEUTIC EXERCISES: CPT

## 2023-01-06 NOTE — PROGRESS NOTES
[address] [facility]  PHYSICAL THERAPY  [] EVALUATION  [x] DAILY NOTE (LAND) [] DAILY NOTE (AQUATIC ) [] PROGRESS NOTE [] DISCHARGE NOTE    [] [address] [facility]   [] [place] [address]    [] [facility]   [x] Jailyn Means    Date: 2023  Patient Name:  Nikhil Toledo  : 1945  MRN: [id number]    Referring Practitioner Carmen Peñaloza DO   Diagnosis Other spondylosis with myelopathy, cervical region [M47.12]    Treatment Diagnosis Cervical dysfunction    Date of Evaluation 22    Additional Pertinent History Diabetes, HBP,       Functional Outcome Measure Used Oswestry NDI   Functional Outcome Score 6 (22)       Insurance: Primary: Payor: Madeleine Magana /  /  / ,   Secondary: HUMANA   Authorization Information: None    Visit # 12  5/10 for progress note   Visits Allowed: Med nec   Recertification Date:    Physician Follow-Up: Dec 14, 2022    Physician Orders: Judy Wilkerson and treat    History of Present Illness: Pain in the neck and for more than a year,  was progressively worsening--Surgery 2022-- fusion due to 2 bulging discs  using bone stimulator -- supposed to use 4 hours a day. SUBJECTIVE: Oralee Daub stated she forgot to lay flat on her back this morning which is part of HEP to assist with neck extension. Pt stated her stomach pains are better today. TREATMENT   Precautions:    Pain: 3/10 cervical L upper trap    X in shaded column indicates activity completed today   Modalities Parameters/  Location  Notes                     Manual Therapy Time/Technique  Notes   STM with myofacial release to B upper traps, paraspinals rhomboids 10 min. x                Exercise/Intervention   Notes   Cervical rotation with towel 5sec.   5x x    Chin tucks, shoulder rolls with 1# weights 15x  x Discussed posture    Cervical isometrics 10x      Shoulder extension, shoulder retraction, horizontal abduction, ER 15x lime x    UBE retro   3.0 min.  x Retro only   Posture at wall then walking away while maintaining  25 ft.  2x     Shoulder circles at wall with ball 10x  x CW/CCW   Scapular strengthening while performing clock on wall 4x Lime             x bilateral   Wall walk up with shoulders abducted with lime green band 15x       Cervical stabs near  wall NO BALL: shoulder flexion, scaption, above head press, bicep curl 15x 1# x    Corner stretch 15 sec 3x x    Stand ext in ll bars   15 sec x 3   x    Discussed pillow use to  hold bone stimulator to decrease neck strain. Specific Interventions Next Treatment: exercise, mainly stabs,  posture, modalities or manual therapy if pain increases     Activity/Treatment Tolerance:  [x]  Patient tolerated treatment well  []  Patient limited by fatigue  []  Patient limited by pain   []  Patient limited by medical complications  []  Other:     Assessment: Continued with scapular strengthening ex with additional ex. . wall walk with tband. Pt is able to improve cervical posture with cues but pain then increases. Body Structures/Functions/Activity Limitations: impaired endurance, impaired ROM, impaired strength, and abnormal posture  Prognosis: good    GOALS:  Patient Goal: less stiffness, better motin. Short and Long Term Goals:  Time Frame: 4 more weeks   Decrease pain to less than a 2 -- MET new goal  -- pain less than 1/10 after exercise.    Increase CROM to 50% of normal, except ext 10%   MET for SB and rotation, not met for ext -- cont goal   Increase strength of the both shoulders and scap muscle to 5/5 Partially met-- still at 4/5   Improve posture  NOT MET  still remains forward head and rounded shoulders, cont goal   I HEP  NOT MET cont goal       Patient Education:   []  HEP/Education Completed: Plan of Care, Goals, posture, cervical isometrics, shoulder extension, retraction, horizontal abduction  [employer] Access Code: [email or web address]    [x]  No new Education completed  [] Reviewed Prior HEP      []  Patient verbalized and/or demonstrated understanding of education provided. []  Patient unable to verbalize and/or demonstrate understanding of education provided. Will continue education. []  Barriers to learning: non    PLAN:  Treatment Recommendations: Strengthening, Range of Motion, Manual Therapy - Soft Tissue Mobilization, Manual Therapy - Joint Manipulation, Pain Management, Home Exercise Program, Patient Education, Positioning, and Modalities    []  Plan of care initiated. Plan to see patient 2 times per week for 5 weeks to address the treatment planned outlined above. [x]  Continue with current plan of care 2 times for 3-4 weeks.   []  Modify plan of care as follows:    []  Hold pending physician visit  []  Discharge    Time In 1305   Time Out 1343   Timed Code Minutes: 38 min   Total Treatment Time: 38 min       Electronically Signed by: Michell Moreno PTA

## 2023-01-09 ENCOUNTER — HOSPITAL ENCOUNTER (OUTPATIENT)
Dept: PHYSICAL THERAPY | Age: 78
Setting detail: THERAPIES SERIES
Discharge: HOME | End: 2023-01-09
Payer: MEDICARE

## 2023-01-09 PROCEDURE — 97140 MANUAL THERAPY 1/> REGIONS: CPT

## 2023-01-09 PROCEDURE — 97110 THERAPEUTIC EXERCISES: CPT

## 2023-01-09 NOTE — PROGRESS NOTES
[address] [facility]  PHYSICAL THERAPY  [] EVALUATION  [x] DAILY NOTE (LAND) [] DAILY NOTE (AQUATIC ) [] PROGRESS NOTE [] DISCHARGE NOTE    [] [address] [place] [address]   [] [place] [address]    [] [facility]   [x] Fallon Amaral    Date: 2023  Patient Name:  Inna Thurman  : 1945  MRN: [id number]    Referring Practitioner Julia Garsia DO   Diagnosis Other spondylosis with myelopathy, cervical region [M47.12]    Treatment Diagnosis Cervical dysfunction    Date of Evaluation 22    Additional Pertinent History Diabetes, HBP,       Functional Outcome Measure Used Oswestry NDI   Functional Outcome Score 6 (22)       Insurance: Primary: Payor: Tae Ac /  /  / ,   Secondary: HUMANA   Authorization Information: None    Visit # 13  6/10 for progress note   Visits Allowed: Med nec   Recertification Date:    Physician Follow-Up: Dec 14, 2022    Physician Orders: Vladimir Adler and treat    History of Present Illness: Pain in the neck and for more than a year,  was progressively worsening--Surgery 2022-- fusion due to 2 bulging discs  using bone stimulator -- supposed to use 4 hours a day. SUBJECTIVE: Edu Duncan stated she has noticed some balance issues lately in crowded areas and at home doing daily activities. TREATMENT   Precautions:    Pain: Denies pain today in cervical region    X in shaded column indicates activity completed today   Modalities Parameters/  Location  Notes                     Manual Therapy Time/Technique  Notes   STM with myofacial release to B upper traps, paraspinals rhomboids 10 min. x                Exercise/Intervention   Notes   Cervical rotation with towel 5sec.   5x x    Chin tucks, shoulder rolls with 1# weights 15x  x Discussed posture    Cervical isometrics 10x      Shoulder extension, shoulder retraction, horizontal abduction, ER 15x lime x    UBE retro   3.0 min.  x Retro only   Posture at wall then walking away while maintaining  25 ft.  2x     Shoulder circles at wall with ball 10x  x CW/CCW   Scapular strengthening while performing clock on wall 4x Lime             x bilateral   Wall walk up with shoulders abducted with lime green band 5x   x    Cervical stabs near  wall NO BALL: shoulder flexion, scaption, above head press, bicep curl 15x 1# x    Corner stretch 15 sec 3x x UE at cheek level and extended   Stand ext in ll bars   15 sec x 3   x    Discussed pillow use to  hold bone stimulator to decrease neck strain. Specific Interventions Next Treatment: exercise, mainly stabs,  posture, modalities or manual therapy if pain increases     Activity/Treatment Tolerance:  [x]  Patient tolerated treatment well  []  Patient limited by fatigue  []  Patient limited by pain   []  Patient limited by medical complications  []  Other:     Assessment: Pt continues to require cues to retract cervical posture especially when she fatigues. Pain levels are more tolerable today. ROM is improving in neck. Body Structures/Functions/Activity Limitations: impaired endurance, impaired ROM, impaired strength, and abnormal posture  Prognosis: good    GOALS:  Patient Goal: less stiffness, better motin. Short and Long Term Goals:  Time Frame: 4 more weeks   Decrease pain to less than a 2 -- MET new goal  -- pain less than 1/10 after exercise.    Increase CROM to 50% of normal, except ext 10%   MET for SB and rotation, not met for ext -- cont goal   Increase strength of the both shoulders and scap muscle to 5/5 Partially met-- still at 4/5   Improve posture  NOT MET  still remains forward head and rounded shoulders, cont goal   I HEP  NOT MET cont goal       Patient Education:   []  HEP/Education Completed: Plan of Care, Goals, posture, cervical isometrics, shoulder extension, retraction, horizontal abduction  [facility] [email or web address]    []  No new Education completed  [x]  Reviewed Prior HEP      [x]  Patient verbalized and/or demonstrated understanding of education provided. []  Patient unable to verbalize and/or demonstrate understanding of education provided. Will continue education. []  Barriers to learning: non    PLAN:  Treatment Recommendations: Strengthening, Range of Motion, Manual Therapy - Soft Tissue Mobilization, Manual Therapy - Joint Manipulation, Pain Management, Home Exercise Program, Patient Education, Positioning, and Modalities    []  Plan of care initiated. Plan to see patient 2 times per week for 5 weeks to address the treatment planned outlined above. [x]  Continue with current plan of care 2 times for 3-4 weeks.   []  Modify plan of care as follows:    []  Hold pending physician visit  []  Discharge    Time In 1435   Time Out 1513   Timed Code Minutes: 38 min   Total Treatment Time: 38 min       Electronically Signed by: Brown Guzman PTA

## 2023-01-12 ENCOUNTER — HOSPITAL ENCOUNTER (OUTPATIENT)
Dept: PHYSICAL THERAPY | Age: 78
Setting detail: THERAPIES SERIES
End: 2023-01-12
Payer: MEDICARE

## 2023-01-18 ENCOUNTER — HOSPITAL ENCOUNTER (OUTPATIENT)
Dept: PHYSICAL THERAPY | Age: 78
Setting detail: THERAPIES SERIES
Discharge: HOME | End: 2023-01-18
Payer: MEDICARE

## 2023-01-18 PROCEDURE — 97110 THERAPEUTIC EXERCISES: CPT

## 2023-01-18 PROCEDURE — 97140 MANUAL THERAPY 1/> REGIONS: CPT

## 2023-01-18 NOTE — PROGRESS NOTES
[address] [facility]  PHYSICAL THERAPY  [] EVALUATION  [x] DAILY NOTE (LAND) [] DAILY NOTE (AQUATIC ) [] PROGRESS NOTE [] DISCHARGE NOTE    [] [address] [facility]   [] [place] [address]    [] [facility]   [x] Vishal Guzman    Date: 2023  Patient Name:  Alonzo Diez  : 1945  MRN: [id number]    Referring Practitioner Amari Gusman DO   Diagnosis Other spondylosis with myelopathy, cervical region [M47.12]    Treatment Diagnosis Cervical dysfunction    Date of Evaluation 22    Additional Pertinent History Diabetes, HBP,       Functional Outcome Measure Used Oswestry NDI   Functional Outcome Score 6 (22)       Insurance: Primary: Payor: MEDICARE /  /  / ,   Secondary: HUMANA   Authorization Information: None    Visit # 14  7/10 for progress note   Visits Allowed: Med nec   Recertification Date:    Physician Follow-Up: Dec 14, 2022    Physician Orders: Alvaro Deleon and treat    History of Present Illness: Pain in the neck and for more than a year,  was progressively worsening--Surgery 2022-- fusion due to 2 bulging discs  using bone stimulator -- supposed to use 4 hours a day. SUBJECTIVE: Samantha Atkinson continues to have sinus infection that she is taking an antibiotic for but doesn't seem to be working. Pt has been sleeping in recliner to improve drainage. Neck has been ok. TREATMENT   Precautions:    Pain: Stiffness due to cold and sleeping in recliner    X in shaded column indicates activity completed today   Modalities Parameters/  Location  Notes                     Manual Therapy Time/Technique  Notes   STM with myofacial release to B upper traps, paraspinals rhomboids 10 min. x                Exercise/Intervention   Notes   Cervical rotation with towel 5sec.   5x     Chin tucks, shoulder rolls with 1# weights 15x   Discussed posture    Cervical isometrics 10x      Shoulder extension, shoulder retraction, horizontal abduction, ER 15x lime x    UBE retro   3.0 min.  x Retro only   Posture at wall then walking away while maintaining  25 ft.  2x     Shoulder circles at wall with ball 10x   CW/CCW   Scapular strengthening while performing clock on wall 4x Lime             x bilateral   Wall walk up with shoulders abducted with lime green band 5x   x    Cervical stabs near  wall NO BALL: shoulder flexion, scaption, above head press, bicep curl 15x 1# x    Corner stretch 15 sec 3x x UE at cheek level and extended   Stand ext in ll bars   15 sec x 3       Discussed pillow use to  hold bone stimulator to decrease neck strain. Specific Interventions Next Treatment: exercise, mainly stabs,  posture, modalities or manual therapy if pain increases     Activity/Treatment Tolerance:  [x]  Patient tolerated treatment well  []  Patient limited by fatigue  []  Patient limited by pain   []  Patient limited by medical complications  []  Other:     Assessment: Modified treatment due to patient no feeling well today. Pt continues to work on posture corrections with less stiffness in B upper traps. Strength is slowly improving in scapular muscles. Body Structures/Functions/Activity Limitations: impaired endurance, impaired ROM, impaired strength, and abnormal posture  Prognosis: good    GOALS:  Patient Goal: less stiffness, better motin. Short and Long Term Goals:  Time Frame: 4 more weeks   Decrease pain to less than a 2 -- MET new goal  -- pain less than 1/10 after exercise.    Increase CROM to 50% of normal, except ext 10%   MET for SB and rotation, not met for ext -- cont goal   Increase strength of the both shoulders and scap muscle to 5/5 Partially met-- still at 4/5   Improve posture  NOT MET  still remains forward head and rounded shoulders, cont goal   I HEP  NOT MET cont goal       Patient Education:   []  HEP/Education Completed: Plan of Care, Goals, posture, cervical isometrics, shoulder extension, retraction, horizontal abduction  Medbridge Access Code: [email or web address]    []  No new Education completed  [x]  Reviewed Prior HEP      [x]  Patient verbalized and/or demonstrated understanding of education provided. []  Patient unable to verbalize and/or demonstrate understanding of education provided. Will continue education. []  Barriers to learning: non    PLAN:  Treatment Recommendations: Strengthening, Range of Motion, Manual Therapy - Soft Tissue Mobilization, Manual Therapy - Joint Manipulation, Pain Management, Home Exercise Program, Patient Education, Positioning, and Modalities    []  Plan of care initiated. Plan to see patient 2 times per week for 5 weeks to address the treatment planned outlined above. [x]  Continue with current plan of care 2 times for 3-4 weeks.   []  Modify plan of care as follows:    []  Hold pending physician visit  []  Discharge    Time In 1130   Time Out 1200   Timed Code Minutes: 30 min   Total Treatment Time: 30 min       Electronically Signed by: Andreia Sibley PTA

## 2023-01-20 ENCOUNTER — HOSPITAL ENCOUNTER (OUTPATIENT)
Dept: PHYSICAL THERAPY | Age: 78
Setting detail: THERAPIES SERIES
Discharge: HOME | End: 2023-01-20
Payer: MEDICARE

## 2023-01-20 PROCEDURE — 97140 MANUAL THERAPY 1/> REGIONS: CPT

## 2023-01-20 PROCEDURE — 97110 THERAPEUTIC EXERCISES: CPT

## 2023-01-20 NOTE — PROGRESS NOTES
[facility]  PHYSICAL THERAPY  [] EVALUATION  [x] DAILY NOTE (LAND) [] DAILY NOTE (AQUATIC ) [] PROGRESS NOTE [] DISCHARGE NOTE    [] OUTPATIENT REHABILITATION CENTER [facility]   [] [facility]    [] [facility]   [x] [place]    Date: 2023  Patient Name:  Margarita Velasco  : 1945  MRN: [id number]    Referring Practitioner Jorge Herbert DO   Diagnosis Other spondylosis with myelopathy, cervical region [M47.12]    Treatment Diagnosis Cervical dysfunction    Date of Evaluation 22    Additional Pertinent History Diabetes, HBP,       Functional Outcome Measure Used Oswestry NDI   Functional Outcome Score 6 (22)       Insurance: Primary: Payor: MEDICARE /  /  / ,   Secondary: HUMANA   Authorization Information: None    Visit # 15  /10 for progress note   Visits Allowed: Med nec   Recertification Date: 2023   Physician Follow-Up: Dec 14, 2022    Physician Orders: Eval and treat    History of Present Illness: Pain in the neck and for more than a year,  was progressively worsening--Surgery 2022-- fusion due to 2 bulging discs  using bone stimulator -- supposed to use 4 hours a day.      SUBJECTIVE: Shavon continues to deal with sinus infection. Pt stated her neck pain comes and goes.     TREATMENT   Precautions:    Pain: Stiffness in neck 2-3/10 cervical region    “X” in shaded column indicates activity completed today   Modalities Parameters/  Location  Notes                     Manual Therapy Time/Technique  Notes   STM with myofacial release to B upper traps, paraspinals rhomboids 10 min. x                Exercise/Intervention   Notes   Cervical rotation with towel 5sec.  5x     Chin tucks, shoulder rolls with 2# weights 15x  x Discussed posture    Cervical isometrics 10x      Shoulder extension, shoulder retraction, horizontal abduction, ER 15x lime x    UBE retro   3.0 min.  x Retro only   Posture at wall then walking  away while maintaining  25 ft.  2x     Shoulder circles at wall with ball 15x  x CW/CCW   Scapular strengthening while performing clock on wall 4x Lime             x bilateral   Wall walk up with shoulders abducted with lime green band 5x   x    Cervical stabs near  wall NO BALL: shoulder flexion, scaption, above head press, bicep curl 15x 2# x    Corner stretch 15 sec 3x x UE at cheek level and extended   Stand ext in ll bars   15 sec x 3   x    Discussed pillow use to  hold bone stimulator to decrease neck strain. Specific Interventions Next Treatment: exercise, mainly stabs,  posture, modalities or manual therapy if pain increases     Activity/Treatment Tolerance:  [x]  Patient tolerated treatment well  []  Patient limited by fatigue  []  Patient limited by pain   []  Patient limited by medical complications  []  Other:     Assessment: Pt did better with upright posture today during standing ex. Progressed to 2# hand weights this date. Pt requires moderate cues for correct technique during ex to keep shoulders relaxed. Body Structures/Functions/Activity Limitations: impaired endurance, impaired ROM, impaired strength, and abnormal posture  Prognosis: good    GOALS:  Patient Goal: less stiffness, better motin. Short and Long Term Goals:  Time Frame: 4 more weeks   Decrease pain to less than a 2 -- MET new goal  -- pain less than 1/10 after exercise.    Increase CROM to 50% of normal, except ext 10%   MET for SB and rotation, not met for ext -- cont goal   Increase strength of the both shoulders and scap muscle to 5/5 Partially met-- still at 4/5   Improve posture  NOT MET  still remains forward head and rounded shoulders, cont goal   I HEP  NOT MET cont goal       Patient Education:   []  HEP/Education Completed: Plan of Care, Goals, posture, cervical isometrics, shoulder extension, retraction, horizontal abduction  Medbridge Access Code: [email or web address]    []  No new Education completed  [x] Reviewed Prior HEP      [x]  Patient verbalized and/or demonstrated understanding of education provided. []  Patient unable to verbalize and/or demonstrate understanding of education provided. Will continue education. []  Barriers to learning: non    PLAN:  Treatment Recommendations: Strengthening, Range of Motion, Manual Therapy - Soft Tissue Mobilization, Manual Therapy - Joint Manipulation, Pain Management, Home Exercise Program, Patient Education, Positioning, and Modalities    []  Plan of care initiated. Plan to see patient 2 times per week for 5 weeks to address the treatment planned outlined above. [x]  Continue with current plan of care 2 times for 3-4 weeks.   []  Modify plan of care as follows:    []  Hold pending physician visit  []  Discharge    Time In 1300   Time Out 1340   Timed Code Minutes: 40 min   Total Treatment Time: 40 min       Electronically Signed by: Elma Atkinson PTA

## 2023-01-23 ENCOUNTER — HOSPITAL ENCOUNTER (OUTPATIENT)
Dept: PHYSICAL THERAPY | Age: 78
Setting detail: THERAPIES SERIES
Discharge: HOME | End: 2023-01-23
Payer: MEDICARE

## 2023-01-23 PROCEDURE — 97110 THERAPEUTIC EXERCISES: CPT

## 2023-01-23 PROCEDURE — 97140 MANUAL THERAPY 1/> REGIONS: CPT

## 2023-01-23 NOTE — DISCHARGE SUMMARY
[facility]  PHYSICAL THERAPY  [] EVALUATION  [] DAILY NOTE (LAND) [] DAILY NOTE (AQUATIC ) [] PROGRESS NOTE [x] DISCHARGE NOTE    [] OUTPATIENT REHABILITATION CENTER [facility]   [] [place] [address]    [] [facility]   [x] Shirley Camara    Date: 2023  Patient Name:  Emi Ernst  : 1945  MRN: [id number]    Referring Practitioner Rubia Cardenas DO   Diagnosis Other spondylosis with myelopathy, cervical region [M47.12]    Treatment Diagnosis Cervical dysfunction    Date of Evaluation 22    Additional Pertinent History Diabetes, HBP,       Functional Outcome Measure Used Oswestry NDI   Functional Outcome Score 6 (22) 5 at discharge       Insurance: Primary: Payor: [employer] [place] /  /  / ,   Secondary: HUMANA   Authorization Information: None    Visit # 16  0/10 for progress note   Visits Allowed: Med nec   Recertification Date:    Physician Follow-Up: Dec 14, 2022    Physician Orders: Janette Myers and treat    History of Present Illness: Pain in the neck and for more than a year,  was progressively worsening--Surgery 2022-- fusion due to 2 bulging discs  using bone stimulator -- supposed to use 4 hours a day. SUBJECTIVE:  no longer using bone stim. Very often  very unconfortable with positioning,   TREATMENT   Precautions:    Pain: Stiffness in neck 2-3/10 cervical region-- left more than right. X in shaded column indicates activity completed today   Modalities Parameters/  Location  Notes                     Manual Therapy Time/Technique  Notes   STM with myofacial release to B upper traps, paraspinals rhomboids 10 min. x                Exercise/Intervention   Notes   Cervical rotation with towel 5sec.   5x     Chin tucks, shoulder rolls with 2# weights 15x  x Discussed posture    Cervical isometrics 10x      Shoulder extension, shoulder retraction, horizontal abduction, ER 15x lime x    UBE retro   3.0 min.  x Retro only Posture at wall then walking away while maintaining  25 ft.  2x     Shoulder circles at wall with ball 15x  x CW/CCW   Scapular strengthening while performing clock on wall 4x Lime             x bilateral   Wall walk up with shoulders abducted with lime green band 10 x   x    Cervical stabs near  wall NO BALL: shoulder flexion, scaption, above head press, bicep curl, abd 10 reps 2#  15x 2# x    Corner stretch 15 sec 3x x UE at cheek level and extended   Stand ext in ll bars   15 sec x 3   x    Discussed pillow use to  hold bone stimulator to decrease neck strain. Specific Interventions Next Treatment: exercise, mainly stabs,  posture, modalities or manual therapy if pain increases     Activity/Treatment Tolerance:  [x]  Patient tolerated treatment well  []  Patient limited by fatigue  []  Patient limited by pain   []  Patient limited by medical complications  []  Other:     Assessment:  progressing towards goals. pain remains high. Body Structures/Functions/Activity Limitations: impaired endurance, impaired ROM, impaired strength, and abnormal posture  Prognosis: good    GOALS:  Patient Goal: less stiffness, better motion. Short and Long Term Goals:  Time Frame: 4 more weeks   -- pain less than 1/10 after exercise. -- not met  remains 3/10    Increase CROM to 50% of normal, except ext 10%   MET -rotation right , pull on the left cervical region. Increase strength of the both shoulders and scap muscle to 5/5   Improve posture   paritally met  increased kyphosis remains and forwrad head has been difficult to correct. I HEP  NOT MET cont goal       Patient Education:   []  HEP/Education Completed: Plan of Care, Goals, posture, cervical isometrics, shoulder extension, retraction, horizontal abduction  [facility] Access Code: [email or web address]    []  No new Education completed  [x]  Reviewed Prior HEP      [x]  Patient verbalized and/or demonstrated understanding of education provided.   []  Patient unable to verbalize and/or demonstrate understanding of education provided. Will continue education. []  Barriers to learning: non    PLAN:  Treatment Recommendations: Strengthening, Range of Motion, Manual Therapy - Soft Tissue Mobilization, Manual Therapy - Joint Manipulation, Pain Management, Home Exercise Program, Patient Education, Positioning, and Modalities    []  Plan of care initiated. Plan to see patient 2 times per week for 5 weeks to address the treatment planned outlined above. []  Continue with current plan of care 2 times for 3-4 weeks.   []  Modify plan of care as follows:    []  Hold pending physician visit  [x]  Discharge    Time In 1345   Time Out 1425   Timed Code Minutes: 40 min   Total Treatment Time: 40 min       Electronically Signed by: Sarah Terry PT

## 2023-06-16 ENCOUNTER — NURSE ONLY (OUTPATIENT)
Dept: LAB | Age: 78
End: 2023-06-16

## 2023-07-14 LAB
ABSOLUTE BASO #: 100 /CMM (ref 0–200)
ABSOLUTE EOS #: 300 /CMM (ref 0–500)
ABSOLUTE LYMPH #: 3900 /CMM (ref 1000–4800)
ABSOLUTE MONO #: 900 /CMM (ref 0–800)
ABSOLUTE NEUT #: 6200 /CMM (ref 1800–7700)
ANION GAP SERPL CALCULATED.3IONS-SCNC: 5 MMOL/L (ref 4–12)
BASOPHILS RELATIVE PERCENT: 0.7 % (ref 0–2)
BUN BLDV-MCNC: 20 MG/DL (ref 7–20)
CALCIUM SERPL-MCNC: 9.1 MG/DL (ref 8.8–10.5)
CHLORIDE BLD-SCNC: 104 MEQ/L (ref 101–111)
CO2: 30 MEQ/L (ref 21–32)
CREAT SERPL-MCNC: 0.51 MG/DL (ref 0.6–1.3)
CREATININE CLEARANCE: >60
EOSINOPHILS RELATIVE PERCENT: 2.3 % (ref 0–6)
GLUCOSE: 94 MG/DL (ref 70–110)
HCT VFR BLD CALC: 43.4 % (ref 35–44)
HEMOGLOBIN: 14.7 GM/DL (ref 12–15)
LYMPHOCYTES RELATIVE PERCENT: 34.4 % (ref 15–45)
MCH RBC QN AUTO: 32.3 PG (ref 27.5–33)
MCHC RBC AUTO-ENTMCNC: 33.8 GM/DL (ref 33–36)
MCV RBC AUTO: 95.6 CU MIC (ref 80–97)
MONOCYTES RELATIVE PERCENT: 8 % (ref 2–10)
NEUTROPHILS RELATIVE PERCENT: 54.6 % (ref 40–70)
NUCLEATED RBCS: 0.1 /100 WBC
PDW BLD-RTO: 14.4 % (ref 12–16)
PLATELET # BLD: 218 TH/CMM (ref 150–400)
POTASSIUM SERPL-SCNC: 3.9 MEQ/L (ref 3.6–5)
RBC # BLD: 4.54 MIL/CMM (ref 4–5.1)
SODIUM BLD-SCNC: 139 MEQ/L (ref 135–145)
WBC # BLD: 11.3 TH/CMM (ref 4.4–10.5)

## 2023-07-24 NOTE — PROGRESS NOTES
EKG 7/14/23 given to anesthesia for review. Per Dr. Julio Sheth ok to proceed at the surgery center 8/8 without further intervention.   Called Dr. Mike Leija office and left message for Brad Jiménze to let her know

## 2023-08-02 RX ORDER — ESOMEPRAZOLE MAGNESIUM 20 MG/1
20 FOR SUSPENSION ORAL DAILY
COMMUNITY

## 2023-08-02 RX ORDER — LORAZEPAM 1 MG/1
1 TABLET ORAL 2 TIMES DAILY PRN
COMMUNITY

## 2023-08-02 RX ORDER — GLIMEPIRIDE 2 MG/1
TABLET ORAL 2 TIMES DAILY
COMMUNITY

## 2023-08-02 NOTE — PROGRESS NOTES
In preparation for their surgical procedure above patient was screened for Obstructive Sleep Apnea (MICHELE) using the STOP-Bang Questionnaire by the Pre-Admission Testing department. This is a pre-surgical screening tool for patient safety and serves as a recommendation, this WILL NOT cause cancellation of surgery. STOP-Bang Questionnaire  * Do you currently see a pulmonologist?  No     If yes STOP, do not complete. Patient follows with Dr.     1.  Do you snore loudly (able to be heard in the next room)? No    2. Do you often feel tired or sleepy during the daytime? No       3. Has anyone ever told you that you stop breathing during your sleep? No    4. Do you have or are you being treated for high blood pressure? Yes      5. BMI more than 35? BMI (Calculated): 32.1        No    6. Age over 48 years? 70 y.o. Yes    7. Neck Circumference greater than 17 inches for male or 16 inches for female? Measured           (visits only)            Not Applicable    8. Gender Male? No      TOTAL SCORE: 2    MICHELE - Low Risk : Yes to 0 - 2 questions  MICHELE - Intermediate Risk : Yes to 3 - 4 questions  MICHELE - High Risk : Yes to 5 - 8 questions    Adapted from:   STOP Questionnaire: A Tool to Screen Patients for Obstructive Sleep Apnea   GREG Cao.C.P.C., Ale Woodson M.B.B.S., Liseth Muñoz M.D., Bette Ponce. Yony Tejeda, Ph.D., CODY Crow.B.B.S., Cheryl Wayne, M.Sc., Nicanor Carvajal M.D., PAOLA Ball.P.C.    Anesthesiology 2008; [phone]:482-80 Copyright 2008, the [place] [employer], [address] [facility].   ----------------------------------------------------------------------------------------------------------------

## 2023-08-02 NOTE — PROGRESS NOTES
NPO after midnight  [employer] and drivers license  Wear comfortable clean clothing  Do not bring jewelry  Shower night before and morning of surgery with a liquid antibacterial soap  Bring list of medications with dosage and how often taken  Follow all instructions given by your physician   needed at discharge  Please limit to 2 visitors for surgery  You must have a responsible adult with you day of surgery and for 24 hours after surgery  Call [phone] for any questions

## 2023-08-08 ENCOUNTER — ANESTHESIA EVENT (OUTPATIENT)
Dept: OPERATING ROOM | Age: 78
End: 2023-08-08
Payer: MEDICARE

## 2023-08-08 ENCOUNTER — HOSPITAL ENCOUNTER (OUTPATIENT)
Age: 78
Setting detail: OUTPATIENT SURGERY
Discharge: HOME | End: 2023-08-08
Attending: STUDENT IN AN ORGANIZED HEALTH CARE EDUCATION/TRAINING PROGRAM | Admitting: STUDENT IN AN ORGANIZED HEALTH CARE EDUCATION/TRAINING PROGRAM
Payer: MEDICARE

## 2023-08-08 ENCOUNTER — ANESTHESIA (OUTPATIENT)
Dept: OPERATING ROOM | Age: 78
End: 2023-08-08
Payer: MEDICARE

## 2023-08-08 VITALS
TEMPERATURE: 96.9 F | SYSTOLIC BLOOD PRESSURE: 177 MMHG | WEIGHT: 178.8 LBS | RESPIRATION RATE: 18 BRPM | HEART RATE: 74 BPM | BODY MASS INDEX: 32.9 KG/M2 | OXYGEN SATURATION: 98 % | HEIGHT: 62 IN | DIASTOLIC BLOOD PRESSURE: 78 MMHG

## 2023-08-08 DIAGNOSIS — G89.18 POSTOPERATIVE PAIN: Primary | ICD-10-CM

## 2023-08-08 DIAGNOSIS — R93.89 THICKENED ENDOMETRIUM: ICD-10-CM

## 2023-08-08 LAB — GLUCOSE BLD STRIP.AUTO-MCNC: 116 MG/DL (ref 70–108)

## 2023-08-08 PROCEDURE — 3600000012 HC SURGERY LEVEL 2 ADDTL 15MIN: Performed by: STUDENT IN AN ORGANIZED HEALTH CARE EDUCATION/TRAINING PROGRAM

## 2023-08-08 PROCEDURE — 82948 REAGENT STRIP/BLOOD GLUCOSE: CPT

## 2023-08-08 PROCEDURE — 3600000002 HC SURGERY LEVEL 2 BASE: Performed by: STUDENT IN AN ORGANIZED HEALTH CARE EDUCATION/TRAINING PROGRAM

## 2023-08-08 PROCEDURE — 2580000003 HC RX 258: Performed by: STUDENT IN AN ORGANIZED HEALTH CARE EDUCATION/TRAINING PROGRAM

## 2023-08-08 PROCEDURE — 2500000003 HC RX 250 WO HCPCS: Performed by: NURSE ANESTHETIST, CERTIFIED REGISTERED

## 2023-08-08 PROCEDURE — 7100000011 HC PHASE II RECOVERY - ADDTL 15 MIN: Performed by: STUDENT IN AN ORGANIZED HEALTH CARE EDUCATION/TRAINING PROGRAM

## 2023-08-08 PROCEDURE — 88305 TISSUE EXAM BY PATHOLOGIST: CPT

## 2023-08-08 PROCEDURE — 3700000001 HC ADD 15 MINUTES (ANESTHESIA): Performed by: STUDENT IN AN ORGANIZED HEALTH CARE EDUCATION/TRAINING PROGRAM

## 2023-08-08 PROCEDURE — 6360000002 HC RX W HCPCS: Performed by: NURSE ANESTHETIST, CERTIFIED REGISTERED

## 2023-08-08 PROCEDURE — 7100000010 HC PHASE II RECOVERY - FIRST 15 MIN: Performed by: STUDENT IN AN ORGANIZED HEALTH CARE EDUCATION/TRAINING PROGRAM

## 2023-08-08 PROCEDURE — 7100000000 HC PACU RECOVERY - FIRST 15 MIN: Performed by: STUDENT IN AN ORGANIZED HEALTH CARE EDUCATION/TRAINING PROGRAM

## 2023-08-08 PROCEDURE — 3700000000 HC ANESTHESIA ATTENDED CARE: Performed by: STUDENT IN AN ORGANIZED HEALTH CARE EDUCATION/TRAINING PROGRAM

## 2023-08-08 PROCEDURE — 6370000000 HC RX 637 (ALT 250 FOR IP): Performed by: STUDENT IN AN ORGANIZED HEALTH CARE EDUCATION/TRAINING PROGRAM

## 2023-08-08 PROCEDURE — 7100000001 HC PACU RECOVERY - ADDTL 15 MIN: Performed by: STUDENT IN AN ORGANIZED HEALTH CARE EDUCATION/TRAINING PROGRAM

## 2023-08-08 PROCEDURE — 2709999900 HC NON-CHARGEABLE SUPPLY: Performed by: STUDENT IN AN ORGANIZED HEALTH CARE EDUCATION/TRAINING PROGRAM

## 2023-08-08 RX ORDER — DEXAMETHASONE SODIUM PHOSPHATE 10 MG/ML
INJECTION, EMULSION INTRAMUSCULAR; INTRAVENOUS PRN
Status: DISCONTINUED | OUTPATIENT
Start: 2023-08-08 | End: 2023-08-08 | Stop reason: SDUPTHER

## 2023-08-08 RX ORDER — ACETAMINOPHEN 160 MG
TABLET,DISINTEGRATING ORAL
COMMUNITY

## 2023-08-08 RX ORDER — SODIUM CHLORIDE 0.9 % (FLUSH) 0.9 %
5-40 SYRINGE (ML) INJECTION EVERY 12 HOURS SCHEDULED
Status: DISCONTINUED | OUTPATIENT
Start: 2023-08-08 | End: 2023-08-08 | Stop reason: HOSPADM

## 2023-08-08 RX ORDER — PROPOFOL 10 MG/ML
INJECTION, EMULSION INTRAVENOUS PRN
Status: DISCONTINUED | OUTPATIENT
Start: 2023-08-08 | End: 2023-08-08 | Stop reason: SDUPTHER

## 2023-08-08 RX ORDER — ONDANSETRON 4 MG/1
4 TABLET, ORALLY DISINTEGRATING ORAL EVERY 8 HOURS PRN
Status: DISCONTINUED | OUTPATIENT
Start: 2023-08-08 | End: 2023-08-08 | Stop reason: HOSPADM

## 2023-08-08 RX ORDER — VITAMIN B COMPLEX
1 CAPSULE ORAL DAILY
COMMUNITY

## 2023-08-08 RX ORDER — SODIUM CHLORIDE 9 MG/ML
INJECTION, SOLUTION INTRAVENOUS PRN
Status: DISCONTINUED | OUTPATIENT
Start: 2023-08-08 | End: 2023-08-08 | Stop reason: HOSPADM

## 2023-08-08 RX ORDER — MORPHINE SULFATE 2 MG/ML
2 INJECTION, SOLUTION INTRAMUSCULAR; INTRAVENOUS
Status: DISCONTINUED | OUTPATIENT
Start: 2023-08-08 | End: 2023-08-08 | Stop reason: HOSPADM

## 2023-08-08 RX ORDER — TRAMADOL HYDROCHLORIDE 50 MG/1
50 TABLET ORAL EVERY 6 HOURS PRN
Status: DISCONTINUED | OUTPATIENT
Start: 2023-08-08 | End: 2023-08-08 | Stop reason: HOSPADM

## 2023-08-08 RX ORDER — ONDANSETRON 2 MG/ML
4 INJECTION INTRAMUSCULAR; INTRAVENOUS EVERY 6 HOURS PRN
Status: DISCONTINUED | OUTPATIENT
Start: 2023-08-08 | End: 2023-08-08 | Stop reason: HOSPADM

## 2023-08-08 RX ORDER — ONDANSETRON 2 MG/ML
INJECTION INTRAMUSCULAR; INTRAVENOUS PRN
Status: DISCONTINUED | OUTPATIENT
Start: 2023-08-08 | End: 2023-08-08 | Stop reason: SDUPTHER

## 2023-08-08 RX ORDER — TRAMADOL HYDROCHLORIDE 50 MG/1
50 TABLET ORAL EVERY 6 HOURS PRN
Qty: 8 TABLET | Refills: 0 | Status: SHIPPED | OUTPATIENT
Start: 2023-08-08 | End: 2023-08-10

## 2023-08-08 RX ORDER — SODIUM CHLORIDE, SODIUM LACTATE, POTASSIUM CHLORIDE, CALCIUM CHLORIDE 600; 310; 30; 20 MG/100ML; MG/100ML; MG/100ML; MG/100ML
INJECTION, SOLUTION INTRAVENOUS SEE ADMIN INSTRUCTIONS
Status: DISCONTINUED | OUTPATIENT
Start: 2023-08-08 | End: 2023-08-08 | Stop reason: HOSPADM

## 2023-08-08 RX ORDER — LIDOCAINE HCL/PF 100 MG/5ML
SYRINGE (ML) INJECTION PRN
Status: DISCONTINUED | OUTPATIENT
Start: 2023-08-08 | End: 2023-08-08 | Stop reason: SDUPTHER

## 2023-08-08 RX ORDER — EPHEDRINE SULFATE/0.9% NACL/PF 50 MG/5 ML
SYRINGE (ML) INTRAVENOUS PRN
Status: DISCONTINUED | OUTPATIENT
Start: 2023-08-08 | End: 2023-08-08 | Stop reason: SDUPTHER

## 2023-08-08 RX ORDER — MORPHINE SULFATE 4 MG/ML
4 INJECTION, SOLUTION INTRAMUSCULAR; INTRAVENOUS
Status: DISCONTINUED | OUTPATIENT
Start: 2023-08-08 | End: 2023-08-08 | Stop reason: HOSPADM

## 2023-08-08 RX ORDER — HYDRALAZINE HYDROCHLORIDE 20 MG/ML
10 INJECTION INTRAMUSCULAR; INTRAVENOUS
Status: DISCONTINUED | OUTPATIENT
Start: 2023-08-08 | End: 2023-08-08 | Stop reason: HOSPADM

## 2023-08-08 RX ORDER — ACETAMINOPHEN 500 MG
1000 TABLET ORAL EVERY 8 HOURS PRN
Status: DISCONTINUED | OUTPATIENT
Start: 2023-08-08 | End: 2023-08-08 | Stop reason: HOSPADM

## 2023-08-08 RX ORDER — VITAMIN B COMPLEX
TABLET ORAL
COMMUNITY

## 2023-08-08 RX ORDER — KETOROLAC TROMETHAMINE 30 MG/ML
15 INJECTION, SOLUTION INTRAMUSCULAR; INTRAVENOUS EVERY 6 HOURS
Status: DISCONTINUED | OUTPATIENT
Start: 2023-08-08 | End: 2023-08-08 | Stop reason: HOSPADM

## 2023-08-08 RX ORDER — SODIUM CHLORIDE 0.9 % (FLUSH) 0.9 %
5-40 SYRINGE (ML) INJECTION PRN
Status: DISCONTINUED | OUTPATIENT
Start: 2023-08-08 | End: 2023-08-08 | Stop reason: HOSPADM

## 2023-08-08 RX ORDER — SODIUM CHLORIDE, SODIUM LACTATE, POTASSIUM CHLORIDE, CALCIUM CHLORIDE 600; 310; 30; 20 MG/100ML; MG/100ML; MG/100ML; MG/100ML
INJECTION, SOLUTION INTRAVENOUS CONTINUOUS
Status: DISCONTINUED | OUTPATIENT
Start: 2023-08-08 | End: 2023-08-08 | Stop reason: HOSPADM

## 2023-08-08 RX ORDER — ONDANSETRON 2 MG/ML
8 INJECTION INTRAMUSCULAR; INTRAVENOUS EVERY 8 HOURS PRN
Status: DISCONTINUED | OUTPATIENT
Start: 2023-08-08 | End: 2023-08-08 | Stop reason: HOSPADM

## 2023-08-08 RX ORDER — MORPHINE SULFATE 2 MG/ML
2 INJECTION, SOLUTION INTRAMUSCULAR; INTRAVENOUS EVERY 5 MIN PRN
Status: DISCONTINUED | OUTPATIENT
Start: 2023-08-08 | End: 2023-08-08 | Stop reason: HOSPADM

## 2023-08-08 RX ORDER — FENTANYL CITRATE 50 UG/ML
50 INJECTION, SOLUTION INTRAMUSCULAR; INTRAVENOUS EVERY 5 MIN PRN
Status: DISCONTINUED | OUTPATIENT
Start: 2023-08-08 | End: 2023-08-08 | Stop reason: HOSPADM

## 2023-08-08 RX ADMIN — PROPOFOL 150 MG: 10 INJECTION, EMULSION INTRAVENOUS at 09:07

## 2023-08-08 RX ADMIN — DEXAMETHASONE SODIUM PHOSPHATE 4 MG: 10 INJECTION, EMULSION INTRAMUSCULAR; INTRAVENOUS at 09:16

## 2023-08-08 RX ADMIN — Medication 20 MG: at 09:19

## 2023-08-08 RX ADMIN — ONDANSETRON 4 MG: 2 INJECTION INTRAMUSCULAR; INTRAVENOUS at 09:16

## 2023-08-08 RX ADMIN — Medication 100 MG: at 09:07

## 2023-08-08 RX ADMIN — TRAMADOL HYDROCHLORIDE 50 MG: 50 TABLET, COATED ORAL at 11:02

## 2023-08-08 RX ADMIN — Medication 10 MG: at 09:18

## 2023-08-08 RX ADMIN — SODIUM CHLORIDE, POTASSIUM CHLORIDE, SODIUM LACTATE AND CALCIUM CHLORIDE: 600; 310; 30; 20 INJECTION, SOLUTION INTRAVENOUS at 07:12

## 2023-08-08 ASSESSMENT — PAIN - FUNCTIONAL ASSESSMENT: PAIN_FUNCTIONAL_ASSESSMENT: 0-10

## 2023-08-08 ASSESSMENT — PAIN DESCRIPTION - LOCATION: LOCATION: VAGINA

## 2023-08-08 ASSESSMENT — PAIN SCALES - GENERAL
PAINLEVEL_OUTOF10: 6
PAINLEVEL_OUTOF10: 7
PAINLEVEL_OUTOF10: 6

## 2023-08-08 NOTE — ANESTHESIA POSTPROCEDURE EVALUATION
Department of Anesthesiology  Postprocedure Note    Patient: Rhoda Hazel  MRN: [id number]  YOB: 1945  Date of evaluation: 8/8/2023      Procedure Summary     Date: 08/08/23 Room / Location: [facility]  Calvin Dash    Anesthesia Start: 0904 Anesthesia Stop: 0940    Procedure: HYSTEROSCOPY, D&C Diagnosis: Thickened endometrium      (Thickened endometrium [R93.89])    Surgeons: Althea Valdez DO Responsible Provider: Caitlyn Vasquez MD    Anesthesia Type: general ASA Status: 3          Anesthesia Type: No value filed.     Ramsey Phase I: Ramsey Score: 9    Ramsey Phase II:        Anesthesia Post Evaluation    Patient location during evaluation: PACU  Patient participation: complete - patient participated  Level of consciousness: awake  Airway patency: patent  Nausea & Vomiting: no vomiting and no nausea  Complications: no  Cardiovascular status: hemodynamically stable  Respiratory status: acceptable  Hydration status: stable  Pain management: adequate

## 2023-08-08 NOTE — PROGRESS NOTES
Pt admitted to [facility] room 6 and oriented to unit. SCD sleeves applied. Nares swabbed. Pt verbalized permission for first name, last initial and physicians name on white board. SDS board and discharge criteria explained, pt and family verbalized understanding. Pt denies thoughts of harming self or others. Call light in reach. Family at the bedside.   Nestor Perez [phone]

## 2023-08-08 NOTE — PROGRESS NOTES
Pt returned to [facility] room 6. Vitals and assessment as charted. LR infusing, @200ml to count from PACU. Pt has shirin crackers, jello, and water. Family at the bedside. Pt and family verbalized understanding of discharge criteria and call light use. Call light in reach.

## 2023-08-08 NOTE — H&P
[facility]  History and Physicial      Patient:  Chelle Denny  YOB: 1945  MRN: [id number]     Acct: [de-identified]    HISTORY OF PRESENT ILLNESS:     Chelle Denny is a 70 y.o. female who presents for scheduled hysteroscopy, D&C due to thickened heterogeneous endometrium. Obstetric History: No obstetric history on file. Past Medical History:        Diagnosis Date    Anxiety     Carotid artery stenosis     Hypertension     Type 2 diabetes mellitus without complication [facility])        Past Surgical History:        Procedure Laterality Date    CAROTID ENDARTERECTOMY Left 09/27/2017    by Dr. Eli Lala      2-4    CHOLECYSTECTOMY      RADIOFREQUENCY ABLATION Bilateral 2023    back    TONSILLECTOMY AND ADENOIDECTOMY      TUBAL LIGATION         Medications: Scheduled Meds:   sodium chloride flush  5-40 mL IntraVENous 2 times per day     Continuous Infusions:   lactated ringers IV soln 125 mL/hr at 08/08/23 8574    sodium chloride       PRN Meds:.sodium chloride flush, sodium chloride, ondansetron    Allergies:  Cefdinir, Ciprofloxacin, Codeine, Amoxicillin, Chlordiazepoxide hcl, Claritin [loratadine], Crestor [rosuvastatin calcium], Flonase [fluticasone propionate], Lisinopril, Norvasc [amlodipine besylate], Proair respiclick [albuterol sulfate], Protonix [pantoprazole sodium], Singulair [montelukast sodium], Trilipix [choline fenofibrate], Zyrtec-d allergy & congestion [cetirizine-pseudoephedrine er], and Tape [adhesive tape]    Social History:    reports that she quit smoking about 2 years ago. Her smoking use included cigarettes. She started smoking about 56 years ago. She smoked an average of .5 packs per day. She has never used smokeless tobacco. She reports that she does not drink alcohol and does not use drugs.     Family History:       Problem Relation Age of Onset    High Blood Pressure Mother     Early Death Father     Heart Disease Father        Review of

## 2023-08-08 NOTE — PROGRESS NOTES

## 2023-08-08 NOTE — DISCHARGE INSTRUCTIONS
DILATATION & CURETTAGE AND/OR HYSTEROSCOPY and/or ABLATION  DISCHARGE INSTRUCTIONS FOR  PATIENTS AND FAMILY  [facility]  [phone]  [address] [place] [address], [address] [facility],[address]  1) Since you may experience some intermittent light-headedness for the next several hours, we suggest you plan on bed rest or quiet relaxation this evening. You must have a friend or relative stay with you tonight. 2)  Because of the sedation you have received, it is recommended that you do not drive a motor vehicle, operate any kind of machinery, or sign any contractual agreement for 24 hours following the procedure. 3)  You should not take alcoholic beverages tonight and only take sleeping medication that has been specifically prescribed for you by your physician. 4)  Eat lightly for your first meal and then gradually progress yourself back to a regular diet. 5)  If you experience pain in your surgical area, take Tylenol, or the pain medication prescribed by your doctor. Some pain medications are very irritating when taken on an empty stomach, so try to take the medication with a light meal or a glass of milk. 6)  If you have any fever, chills, bleeding, or uncontrollable pain or any other problems, notify your surgeon. 7)  Other instructions:   Pain:  Ibuprofen every 6 hours as needed, call if no relief. Activity:  Take it easy for 1-2 days   Exercise:  None for 1 week   Sex, douching, tampons:  None for 1 week   Shower: In 24 hours   Tub bath, swimming:  None for 1 week   Appointment:  Call for an appointment in 1 week if appointment not already made.      Bev Rivas DO 8/8/2023 9:47 AM

## 2023-10-06 NOTE — OP NOTE
Gyn Service    Operative Report        Pt Name: Maeve Vera  MRN: [id number] [facility] #: [de-identified]  YOB: 1945  Procedure Performed By: Davina Goel DO      Pre-operative Diagnosis: Thickened endometrium    Post-operative Diagnosis:  SAME plus endometrial polyp    Procedure:  HYSTEROSCOPY, D AND C, polypectomy    Surgeon:  Davina Goel DO     Anesthesia:  General    Estimated blood loss: 08XA    Complications:  NONE    Specimens: Endometrial curettings and polyp    Findings  Uterus sounded to 7cm  2. Hysteroscopy: endometrial cavity with small polyp. Normal appearing endometrium after polypectomy. Description of Procedure in Detail  After appropriate consents were signed the patient was taken to the operating room and general anesthesia was administered. The patient was then prepped and draped in the normal sterile fashion. She was placed in dorsal lithotomy position with legs supported by stirrups. A weighted speculum was then placed in the vaginal canal. A right angle retractor was used to visualize the cervix. Double tooth tenaculum was used to grasp the anterior lip of cervix. The cervix was then serially dilated to accommodate the hysteroscope. The hysteroscope was then assembled and white balanced. The hysteroscope was then inserted using normal saline as distending media. Examination of the uterine cavity was performed with the above-stated findings. The hysteroscope was then removed. A sharp curette was inserted and all surfaces of the uterine cavity were curettaged until gritty texture was felt. Polyp forceps were used to grasp and completely remove the small polyp that was visualized. The endometrial curettings were then sent to pathology for further evaluation. Hysteroscope was reinserted and normal appearing uterine cavity visualized. At this point, all instruments were removed from the vagina. Good hemostasis was noted.        At the end of the procedure, all instrument, Yes

## 2024-06-19 ENCOUNTER — HOSPITAL ENCOUNTER (OUTPATIENT)
Dept: MRI IMAGING | Age: 79
Discharge: HOME | End: 2024-06-19
Attending: RADIOLOGY

## 2024-06-19 DIAGNOSIS — R69 DIAGNOSIS UNKNOWN: ICD-10-CM

## 2025-03-17 ENCOUNTER — HOSPITAL ENCOUNTER (OUTPATIENT)
Dept: PHYSICAL THERAPY | Age: 80
Setting detail: THERAPIES SERIES
Discharge: HOME | End: 2025-03-17
Payer: MEDICARE

## 2025-03-17 PROCEDURE — 97162 PT EVAL MOD COMPLEX 30 MIN: CPT

## 2025-03-17 PROCEDURE — 97110 THERAPEUTIC EXERCISES: CPT

## 2025-03-17 NOTE — PROGRESS NOTES
[]  Hold pending physician visit  []  Discharge    Time In 1115   Time Out 1205   Timed Code Minutes: 9 min   Total Treatment Time: 50 min       Electronically Signed by: Radha Antunez, PT

## 2025-03-20 ENCOUNTER — HOSPITAL ENCOUNTER (OUTPATIENT)
Dept: PHYSICAL THERAPY | Age: 80
Setting detail: THERAPIES SERIES
Discharge: HOME | End: 2025-03-20
Payer: MEDICARE

## 2025-03-20 PROCEDURE — 97112 NEUROMUSCULAR REEDUCATION: CPT

## 2025-03-20 PROCEDURE — 97530 THERAPEUTIC ACTIVITIES: CPT

## 2025-03-20 PROCEDURE — 97110 THERAPEUTIC EXERCISES: CPT

## 2025-03-20 NOTE — PROGRESS NOTES
[facility]  PHYSICAL THERAPY  [] EVALUATION  [x] DAILY NOTE (LAND) [] DAILY NOTE (AQUATIC ) [] PROGRESS NOTE [] DISCHARGE NOTE    [] OUTPATIENT REHABILITATION CENTER [facility]   [] [place] [facility]    [] [facility]   [x] [facility]    Date: 3/20/2025  Patient Name:  Margarita Velasco  : 1945  MRN: [id number]  CSN: [id number]    Referring Practitioner Madiha Matamoros PA [id number]      Diagnosis  Diagnoses       R42 (ICD-10-CM) - Dizziness and giddiness    Z98.890 (ICD-10-CM) - Other specified postprocedural states    I10 (ICD-10-CM) - Essential (primary) hypertension           Treatment Diagnosis R26.81  Unsteadiness on feet  R26.89  Abnormalities of gait and mobility  R42   Dizziness and giddiness  R53.1 Weakness   Date of Evaluation 3/17/25   Additional Pertinent History Margarita Velasco has a past medical history of Anxiety, Carotid artery stenosis, Hypertension, and Type 2 diabetes mellitus without complication (HCC).  she has a past surgical history that includes Cholecystectomy; Carotid endarterectomy (Left, 2017); cervical fusion; Tonsillectomy and adenoidectomy; Tubal ligation; Radiofrequency ablation (Bilateral, ); and Dilation and curettage of uterus (N/A, 2023).  Denies hip and knee problems,    Allergies Allergies   Allergen Reactions    Cefdinir Nausea Only     AND ABDOMINAL PAIN    Ciprofloxacin Other (See Comments)     ABDOMINAL PAIN    Codeine Other (See Comments)     CHEST PAIN    Amoxicillin Other (See Comments)     PT DOESN'T KNOW    Chlordiazepoxide Hcl Other (See Comments)     PATIENT DOESN'T KNOW    Claritin [Loratadine] Other (See Comments)    Crestor [Rosuvastatin Calcium] Nausea Only     & TURNS URINE YELLOW    Flonase [Fluticasone Propionate] Other (See Comments)     PATIENT DOESN'T KNOW    Lisinopril Other (See Comments)     FATIGUE      Norvasc [Amlodipine Besylate] Other (See Comments)     PATIENT DOESN'T KNOW

## 2025-03-25 ENCOUNTER — HOSPITAL ENCOUNTER (OUTPATIENT)
Dept: PHYSICAL THERAPY | Age: 80
Setting detail: THERAPIES SERIES
Discharge: HOME | End: 2025-03-25
Payer: MEDICARE

## 2025-03-25 PROCEDURE — 97530 THERAPEUTIC ACTIVITIES: CPT

## 2025-03-25 NOTE — PROGRESS NOTES
[facility]  PHYSICAL THERAPY  [] EVALUATION  [x] DAILY NOTE (LAND) [] DAILY NOTE (AQUATIC ) [] PROGRESS NOTE [] DISCHARGE NOTE    [] OUTPATIENT REHABILITATION CENTER [facility]   [] [place] [facility]    [] [facility]   [x] [facility]    Date: 3/25/2025  Patient Name:  Margarita Velasco  : 1945  MRN: [id number]  CSN: [id number]    Referring Practitioner Madiha Matamoros PA [phone]      Diagnosis  Diagnoses       R42 (ICD-10-CM) - Dizziness and giddiness    Z98.890 (ICD-10-CM) - Other specified postprocedural states    I10 (ICD-10-CM) - Essential (primary) hypertension           Treatment Diagnosis R26.81  Unsteadiness on feet  R26.89  Abnormalities of gait and mobility  R42   Dizziness and giddiness  R53.1 Weakness   Date of Evaluation 3/17/25   Additional Pertinent History Margarita Velasco has a past medical history of Anxiety, Carotid artery stenosis, Hypertension, and Type 2 diabetes mellitus without complication (HCC).  she has a past surgical history that includes Cholecystectomy; Carotid endarterectomy (Left, 2017); cervical fusion; Tonsillectomy and adenoidectomy; Tubal ligation; Radiofrequency ablation (Bilateral, ); and Dilation and curettage of uterus (N/A, 2023).  Denies hip and knee problems,    Allergies Allergies   Allergen Reactions    Cefdinir Nausea Only     AND ABDOMINAL PAIN    Ciprofloxacin Other (See Comments)     ABDOMINAL PAIN    Codeine Other (See Comments)     CHEST PAIN    Amoxicillin Other (See Comments)     PT DOESN'T KNOW    Chlordiazepoxide Hcl Other (See Comments)     PATIENT DOESN'T KNOW    Claritin [Loratadine] Other (See Comments)    Crestor [Rosuvastatin Calcium] Nausea Only     & TURNS URINE YELLOW    Flonase [Fluticasone Propionate] Other (See Comments)     PATIENT DOESN'T KNOW    Lisinopril Other (See Comments)     FATIGUE      Norvasc [Amlodipine Besylate] Other (See Comments)     PATIENT DOESN'T KNOW

## 2025-03-27 ENCOUNTER — HOSPITAL ENCOUNTER (OUTPATIENT)
Dept: PHYSICAL THERAPY | Age: 80
Setting detail: THERAPIES SERIES
Discharge: HOME | End: 2025-03-27
Payer: MEDICARE

## 2025-03-27 PROCEDURE — 97530 THERAPEUTIC ACTIVITIES: CPT

## 2025-03-27 PROCEDURE — 97110 THERAPEUTIC EXERCISES: CPT

## 2025-03-27 NOTE — PROGRESS NOTES
[facility]  PHYSICAL THERAPY  [] EVALUATION  [x] DAILY NOTE (LAND) [] DAILY NOTE (AQUATIC ) [] PROGRESS NOTE [] DISCHARGE NOTE    [] OUTPATIENT REHABILITATION CENTER [facility]   [] [place] [facility]    [] [facility]   [x] [facility]    Date: 3/27/2025  Patient Name:  Margarita Velasco  : 1945  MRN: [id number]  CSN: [id number]    Referring Practitioner Madiha Matamoros PA [phone]      Diagnosis  Diagnoses       R42 (ICD-10-CM) - Dizziness and giddiness    Z98.890 (ICD-10-CM) - Other specified postprocedural states    I10 (ICD-10-CM) - Essential (primary) hypertension           Treatment Diagnosis R26.81  Unsteadiness on feet  R26.89  Abnormalities of gait and mobility  R42   Dizziness and giddiness  R53.1 Weakness   Date of Evaluation 3/17/25   Additional Pertinent History Margarita Velasco has a past medical history of Anxiety, Carotid artery stenosis, Hypertension, and Type 2 diabetes mellitus without complication (HCC).  she has a past surgical history that includes Cholecystectomy; Carotid endarterectomy (Left, 2017); cervical fusion; Tonsillectomy and adenoidectomy; Tubal ligation; Radiofrequency ablation (Bilateral, ); and Dilation and curettage of uterus (N/A, 2023).  Denies hip and knee problems,    Allergies Allergies   Allergen Reactions    Cefdinir Nausea Only     AND ABDOMINAL PAIN    Ciprofloxacin Other (See Comments)     ABDOMINAL PAIN    Codeine Other (See Comments)     CHEST PAIN    Amoxicillin Other (See Comments)     PT DOESN'T KNOW    Chlordiazepoxide Hcl Other (See Comments)     PATIENT DOESN'T KNOW    Claritin [Loratadine] Other (See Comments)    Crestor [Rosuvastatin Calcium] Nausea Only     & TURNS URINE YELLOW    Flonase [Fluticasone Propionate] Other (See Comments)     PATIENT DOESN'T KNOW    Lisinopril Other (See Comments)     FATIGUE      Norvasc [Amlodipine Besylate] Other (See Comments)     PATIENT DOESN'T KNOW

## 2025-04-01 ENCOUNTER — HOSPITAL ENCOUNTER (OUTPATIENT)
Dept: PHYSICAL THERAPY | Age: 80
Setting detail: THERAPIES SERIES
Discharge: HOME | End: 2025-04-01
Payer: MEDICARE

## 2025-04-01 PROCEDURE — 97530 THERAPEUTIC ACTIVITIES: CPT

## 2025-04-01 NOTE — PROGRESS NOTES
[facility]  PHYSICAL THERAPY  [] EVALUATION  [x] DAILY NOTE (LAND) [] DAILY NOTE (AQUATIC ) [] PROGRESS NOTE [] DISCHARGE NOTE    [] OUTPATIENT REHABILITATION CENTER [facility]   [] [place] [facility]    [] [facility]   [x] [facility]    Date: 2025  Patient Name:  Margarita Velasco  : 1945  MRN: [id number]  CSN: [id number]    Referring Practitioner Madiha Matamoros PA [phone]      Diagnosis  Diagnoses       R42 (ICD-10-CM) - Dizziness and giddiness    Z98.890 (ICD-10-CM) - Other specified postprocedural states    I10 (ICD-10-CM) - Essential (primary) hypertension           Treatment Diagnosis R26.81  Unsteadiness on feet  R26.89  Abnormalities of gait and mobility  R42   Dizziness and giddiness  R53.1 Weakness   Date of Evaluation 3/17/25   Additional Pertinent History Margarita Velasco has a past medical history of Anxiety, Carotid artery stenosis, Hypertension, and Type 2 diabetes mellitus without complication (HCC).  she has a past surgical history that includes Cholecystectomy; Carotid endarterectomy (Left, 2017); cervical fusion; Tonsillectomy and adenoidectomy; Tubal ligation; Radiofrequency ablation (Bilateral, ); and Dilation and curettage of uterus (N/A, 2023).  Denies hip and knee problems,    Allergies Allergies   Allergen Reactions    Cefdinir Nausea Only     AND ABDOMINAL PAIN    Ciprofloxacin Other (See Comments)     ABDOMINAL PAIN    Codeine Other (See Comments)     CHEST PAIN    Amoxicillin Other (See Comments)     PT DOESN'T KNOW    Chlordiazepoxide Hcl Other (See Comments)     PATIENT DOESN'T KNOW    Claritin [Loratadine] Other (See Comments)    Crestor [Rosuvastatin Calcium] Nausea Only     & TURNS URINE YELLOW    Flonase [Fluticasone Propionate] Other (See Comments)     PATIENT DOESN'T KNOW    Lisinopril Other (See Comments)     FATIGUE      Norvasc [Amlodipine Besylate] Other (See Comments)     PATIENT DOESN'T KNOW

## 2025-04-03 ENCOUNTER — HOSPITAL ENCOUNTER (OUTPATIENT)
Dept: PHYSICAL THERAPY | Age: 80
Setting detail: THERAPIES SERIES
Discharge: HOME | End: 2025-04-03
Payer: MEDICARE

## 2025-04-03 PROCEDURE — 97110 THERAPEUTIC EXERCISES: CPT

## 2025-04-03 PROCEDURE — 97530 THERAPEUTIC ACTIVITIES: CPT

## 2025-04-03 NOTE — PROGRESS NOTES
[facility]  PHYSICAL THERAPY  [] EVALUATION  [x] DAILY NOTE (LAND) [] DAILY NOTE (AQUATIC ) [] PROGRESS NOTE [] DISCHARGE NOTE    [] OUTPATIENT REHABILITATION CENTER [facility]   [] [place] [facility]    [] [facility]   [x] [facility]    Date: 4/3/2025  Patient Name:  Margarita Velasco  : 1945  MRN: [id number]  CSN: [id number]    Referring Practitioner Madiha Matamoros PA [phone]      Diagnosis  Diagnoses       R42 (ICD-10-CM) - Dizziness and giddiness    Z98.890 (ICD-10-CM) - Other specified postprocedural states    I10 (ICD-10-CM) - Essential (primary) hypertension           Treatment Diagnosis R26.81  Unsteadiness on feet  R26.89  Abnormalities of gait and mobility  R42   Dizziness and giddiness  R53.1 Weakness   Date of Evaluation 3/17/25   Additional Pertinent History Margarita Velasco has a past medical history of Anxiety, Carotid artery stenosis, Hypertension, and Type 2 diabetes mellitus without complication (HCC).  she has a past surgical history that includes Cholecystectomy; Carotid endarterectomy (Left, 2017); cervical fusion; Tonsillectomy and adenoidectomy; Tubal ligation; Radiofrequency ablation (Bilateral, ); and Dilation and curettage of uterus (N/A, 2023).  Denies hip and knee problems,    Allergies Allergies   Allergen Reactions    Cefdinir Nausea Only     AND ABDOMINAL PAIN    Ciprofloxacin Other (See Comments)     ABDOMINAL PAIN    Codeine Other (See Comments)     CHEST PAIN    Amoxicillin Other (See Comments)     PT DOESN'T KNOW    Chlordiazepoxide Hcl Other (See Comments)     PATIENT DOESN'T KNOW    Claritin [Loratadine] Other (See Comments)    Crestor [Rosuvastatin Calcium] Nausea Only     & TURNS URINE YELLOW    Flonase [Fluticasone Propionate] Other (See Comments)     PATIENT DOESN'T KNOW    Lisinopril Other (See Comments)     FATIGUE      Norvasc [Amlodipine Besylate] Other (See Comments)     PATIENT DOESN'T KNOW

## 2025-04-09 ENCOUNTER — HOSPITAL ENCOUNTER (OUTPATIENT)
Dept: PHYSICAL THERAPY | Age: 80
Setting detail: THERAPIES SERIES
Discharge: HOME | End: 2025-04-09
Payer: MEDICARE

## 2025-04-09 PROCEDURE — 97530 THERAPEUTIC ACTIVITIES: CPT

## 2025-04-09 NOTE — PROGRESS NOTES
[facility]  PHYSICAL THERAPY  [] EVALUATION  [x] DAILY NOTE (LAND) [] DAILY NOTE (AQUATIC ) [] PROGRESS NOTE [] DISCHARGE NOTE    [] OUTPATIENT REHABILITATION CENTER [facility]   [] [place] [facility]    [] [facility]   [x] [facility]    Date: 2025  Patient Name:  Margarita Velasco  : 1945  MRN: [id number]  CSN: [id number]    Referring Practitioner Madiha Matamoros PA [phone]      Diagnosis  Diagnoses       R42 (ICD-10-CM) - Dizziness and giddiness    Z98.890 (ICD-10-CM) - Other specified postprocedural states    I10 (ICD-10-CM) - Essential (primary) hypertension           Treatment Diagnosis R26.81  Unsteadiness on feet  R26.89  Abnormalities of gait and mobility  R42   Dizziness and giddiness  R53.1 Weakness   Date of Evaluation 3/17/25   Additional Pertinent History Margarita Velasco has a past medical history of Anxiety, Carotid artery stenosis, Hypertension, and Type 2 diabetes mellitus without complication (HCC).  she has a past surgical history that includes Cholecystectomy; Carotid endarterectomy (Left, 2017); cervical fusion; Tonsillectomy and adenoidectomy; Tubal ligation; Radiofrequency ablation (Bilateral, ); and Dilation and curettage of uterus (N/A, 2023).  Denies hip and knee problems,    Allergies Allergies   Allergen Reactions    Cefdinir Nausea Only     AND ABDOMINAL PAIN    Ciprofloxacin Other (See Comments)     ABDOMINAL PAIN    Codeine Other (See Comments)     CHEST PAIN    Amoxicillin Other (See Comments)     PT DOESN'T KNOW    Chlordiazepoxide Hcl Other (See Comments)     PATIENT DOESN'T KNOW    Claritin [Loratadine] Other (See Comments)    Crestor [Rosuvastatin Calcium] Nausea Only     & TURNS URINE YELLOW    Flonase [Fluticasone Propionate] Other (See Comments)     PATIENT DOESN'T KNOW    Lisinopril Other (See Comments)     FATIGUE      Norvasc [Amlodipine Besylate] Other (See Comments)     PATIENT DOESN'T KNOW

## 2025-04-11 ENCOUNTER — HOSPITAL ENCOUNTER (OUTPATIENT)
Dept: PHYSICAL THERAPY | Age: 80
Setting detail: THERAPIES SERIES
Discharge: HOME | End: 2025-04-11
Payer: MEDICARE

## 2025-04-11 PROCEDURE — 97530 THERAPEUTIC ACTIVITIES: CPT

## 2025-04-11 NOTE — PROGRESS NOTES
new Education completed  [x]  Reviewed Prior HEP      [x]  Patient verbalized and/or demonstrated understanding of education provided.  []  Patient unable to verbalize and/or demonstrate understanding of education provided.  Will continue education.  []  Barriers to learning:     PLAN:  Treatment Recommendations: Strengthening, Range of Motion, Balance Training, Functional Mobility Training, Transfer Training, Gait Training, Neuromuscular Re-education, Home Exercise Program, Patient Education, and Safety Education and Training    []  Plan of care initiated.  Plan to see patient 2 times per week for 10 weeks to address the treatment planned outlined above.  [x]  Continue with current plan of care  []  Modify plan of care as follows:    []  Hold pending physician visit  []  Discharge    Time In 1121   Time Out 1201   Timed Code Minutes: 40 min   Total Treatment Time: 40 min       Electronically Signed by: Nixon Gonzalez PTA

## 2025-04-15 ENCOUNTER — HOSPITAL ENCOUNTER (OUTPATIENT)
Dept: PHYSICAL THERAPY | Age: 80
Setting detail: THERAPIES SERIES
Discharge: HOME | End: 2025-04-15
Payer: MEDICARE

## 2025-04-15 PROCEDURE — 97530 THERAPEUTIC ACTIVITIES: CPT

## 2025-04-15 NOTE — PROGRESS NOTES
stance  X 5 sec ea L/R X Needs UE support with SLS on R          Ambulated through clinic with SC Various distances  X    Stair Stretches: Hamstring, Calf, Quad  [id number]  X    Step-ups: Forward and Lateral  10x  X           Slingshot Hurdles: Forward and Lateral*  10x  X           Seated:          LAQs x15   X       Sit to Stands x10  X       Resisted HS curls x15  blue X              Resisted abd with ball x10  X      Specific Interventions Next Treatment: balance, LE strengthening, balance, gait training, monitor for positional vertigo     Activity/Treatment Tolerance:  [x]  Patient tolerated treatment well  []  Patient limited by fatigue  []  Patient limited by pain   []  Patient limited by medical complications  []  Other:     Assessment: Treatment interventions completed as recorded above. Progressions and additional exercises indicated by * in chart. Occasional therapeutic rest breaks required throughout treatment this date.        Body Structures/Functions/Activity Limitations: impaired activity tolerance, impaired balance, impaired strength, pain, abnormal gait, and abnormal posture  Prognosis: good    GOALS:  Patient Goal: walk without walker, no unsteadiness     Short Term Goals: 5 week  Pt to increase B LE strength to 4+   Pt to perform 5 reps sit to stand withou use of arms in 30 sec  Pt to walk for 12 feet and turn head 2 time to the right without path deviaton  Pt to stand on foam for 30 sec with feet together without LOB   Pt to walk  with cane and supervision in the clinic      Long Term Goals: 10 weels  B LE strength 5/5 to allow for walking up and down steps to basement in order to do laundry and shower   Pt to stand and turn head 10 times without LOB or UE support  PT to walk for  40 feet and with cane or no device without path deviation  Pt with show no loss in balacne with 10 reps sit to stand without UE hold   I HEP       Patient Education:   []  HEP/Education Completed: Plan of Care, Goals,

## 2025-04-17 ENCOUNTER — HOSPITAL ENCOUNTER (OUTPATIENT)
Dept: PHYSICAL THERAPY | Age: 80
Setting detail: THERAPIES SERIES
Discharge: HOME | End: 2025-04-17
Payer: MEDICARE

## 2025-04-17 PROCEDURE — 97530 THERAPEUTIC ACTIVITIES: CPT

## 2025-04-17 PROCEDURE — 97110 THERAPEUTIC EXERCISES: CPT

## 2025-04-17 NOTE — PROGRESS NOTES
[facility]  PHYSICAL THERAPY  [] EVALUATION  [x] DAILY NOTE (LAND) [] DAILY NOTE (AQUATIC ) [] PROGRESS NOTE [] DISCHARGE NOTE    [] OUTPATIENT REHABILITATION CENTER [facility]   [] [place] [facility]    [] [facility]   [x] [facility]    Date: 2025  Patient Name:  Margarita Velasco  : 1945  MRN: [id number]  CSN: [id number]    Referring Practitioner Madiha Matamoros PA [phone]      Diagnosis  Diagnoses       R42 (ICD-10-CM) - Dizziness and giddiness    Z98.890 (ICD-10-CM) - Other specified postprocedural states    I10 (ICD-10-CM) - Essential (primary) hypertension           Treatment Diagnosis R26.81  Unsteadiness on feet  R26.89  Abnormalities of gait and mobility  R42   Dizziness and giddiness  R53.1 Weakness   Date of Evaluation 3/17/25   Additional Pertinent History Margarita Velasco has a past medical history of Anxiety, Carotid artery stenosis, Hypertension, and Type 2 diabetes mellitus without complication (HCC).  she has a past surgical history that includes Cholecystectomy; Carotid endarterectomy (Left, 2017); cervical fusion; Tonsillectomy and adenoidectomy; Tubal ligation; Radiofrequency ablation (Bilateral, ); and Dilation and curettage of uterus (N/A, 2023).  Denies hip and knee problems,    Allergies Allergies   Allergen Reactions    Cefdinir Nausea Only     AND ABDOMINAL PAIN    Ciprofloxacin Other (See Comments)     ABDOMINAL PAIN    Codeine Other (See Comments)     CHEST PAIN    Amoxicillin Other (See Comments)     PT DOESN'T KNOW    Chlordiazepoxide Hcl Other (See Comments)     PATIENT DOESN'T KNOW    Claritin [Loratadine] Other (See Comments)    Crestor [Rosuvastatin Calcium] Nausea Only     & TURNS URINE YELLOW    Flonase [Fluticasone Propionate] Other (See Comments)     PATIENT DOESN'T KNOW    Lisinopril Other (See Comments)     FATIGUE      Norvasc [Amlodipine Besylate] Other (See Comments)     PATIENT DOESN'T KNOW

## 2025-04-21 ENCOUNTER — APPOINTMENT (OUTPATIENT)
Dept: PHYSICAL THERAPY | Age: 80
End: 2025-04-21
Payer: MEDICARE

## 2025-04-21 ENCOUNTER — HOSPITAL ENCOUNTER (OUTPATIENT)
Dept: PHYSICAL THERAPY | Age: 80
Setting detail: THERAPIES SERIES
Discharge: HOME | End: 2025-04-21
Payer: MEDICARE

## 2025-04-21 PROCEDURE — 97110 THERAPEUTIC EXERCISES: CPT

## 2025-04-21 PROCEDURE — 97112 NEUROMUSCULAR REEDUCATION: CPT

## 2025-04-21 NOTE — PROGRESS NOTES
[facility]  PHYSICAL THERAPY  [] EVALUATION  [] DAILY NOTE (LAND) [] DAILY NOTE (AQUATIC ) [x] PROGRESS NOTE [] DISCHARGE NOTE    [] OUTPATIENT REHABILITATION CENTER [facility]   [] [facility] [facility]    [] [facility]   [x] [place] [facility]    Date: 2025  Patient Name:  Margarita Velasco  : 1945  MRN: [id number]  [facility]: [id number]    Referring Practitioner Madiha Matamoros PA [phone]      Diagnosis  Diagnoses       R42 (ICD-10-CM) - Dizziness and giddiness    Z98.890 (ICD-10-CM) - Other specified postprocedural states    I10 (ICD-10-CM) - Essential (primary) hypertension           Treatment Diagnosis R26.81  Unsteadiness on feet  R26.89  Abnormalities of gait and mobility  R42   Dizziness and giddiness  R53.1 Weakness   Date of Evaluation 3/17/25   Additional Pertinent History Margarita Velasco has a past medical history of Anxiety, Carotid artery stenosis, Hypertension, and Type 2 diabetes mellitus without complication (HCC).  she has a past surgical history that includes Cholecystectomy; Carotid endarterectomy (Left, 2017); cervical fusion; Tonsillectomy and adenoidectomy; Tubal ligation; Radiofrequency ablation (Bilateral, ); and Dilation and curettage of uterus (N/A, 2023).  Denies hip and knee problems,    Allergies Allergies   Allergen Reactions    Cefdinir Nausea Only     AND ABDOMINAL PAIN    Ciprofloxacin Other (See Comments)     ABDOMINAL PAIN    Codeine Other (See Comments)     CHEST PAIN    Amoxicillin Other (See Comments)     PT DOESN'T KNOW    Chlordiazepoxide Hcl Other (See Comments)     PATIENT DOESN'T KNOW    Claritin [Loratadine] Other (See Comments)    Crestor [Rosuvastatin Calcium] Nausea Only     & TURNS URINE YELLOW    Flonase [Fluticasone Propionate] Other (See Comments)     PATIENT DOESN'T KNOW    Lisinopril Other (See Comments)     FATIGUE      Norvasc [Amlodipine Besylate] Other (See Comments)     PATIENT DOESN'T KNOW

## 2025-04-23 ENCOUNTER — HOSPITAL ENCOUNTER (OUTPATIENT)
Dept: PHYSICAL THERAPY | Age: 80
Setting detail: THERAPIES SERIES
Discharge: HOME | End: 2025-04-23
Payer: MEDICARE

## 2025-04-23 PROCEDURE — 97530 THERAPEUTIC ACTIVITIES: CPT

## 2025-04-23 NOTE — PROGRESS NOTES
[facility]  PHYSICAL THERAPY  [] EVALUATION  [x] DAILY NOTE (LAND) [] DAILY NOTE (AQUATIC ) [] PROGRESS NOTE [] DISCHARGE NOTE    [] OUTPATIENT REHABILITATION CENTER [facility]   [] [place] [facility]    [] [facility]   [x] [place] [facility]    Date: 2025  Patient Name:  Margarita Velasco  : 1945  MRN: [id number]  CSN: [id number]    Referring Practitioner Madiha Matamoros PA [phone]      Diagnosis  Diagnoses       R42 (ICD-10-CM) - Dizziness and giddiness    Z98.890 (ICD-10-CM) - Other specified postprocedural states    I10 (ICD-10-CM) - Essential (primary) hypertension           Treatment Diagnosis R26.81  Unsteadiness on feet  R26.89  Abnormalities of gait and mobility  R42   Dizziness and giddiness  R53.1 Weakness   Date of Evaluation 3/17/25   Additional Pertinent History Margarita Velasco has a past medical history of Anxiety, Carotid artery stenosis, Hypertension, and Type 2 diabetes mellitus without complication (HCC).  she has a past surgical history that includes Cholecystectomy; Carotid endarterectomy (Left, 2017); cervical fusion; Tonsillectomy and adenoidectomy; Tubal ligation; Radiofrequency ablation (Bilateral, ); and Dilation and curettage of uterus (N/A, 2023).  Denies hip and knee problems,    Allergies Allergies   Allergen Reactions    Cefdinir Nausea Only     AND ABDOMINAL PAIN    Ciprofloxacin Other (See Comments)     ABDOMINAL PAIN    Codeine Other (See Comments)     CHEST PAIN    Amoxicillin Other (See Comments)     PT DOESN'T KNOW    Chlordiazepoxide Hcl Other (See Comments)     PATIENT DOESN'T KNOW    Claritin [Loratadine] Other (See Comments)    Crestor [Rosuvastatin Calcium] Nausea Only     & TURNS URINE YELLOW    Flonase [Fluticasone Propionate] Other (See Comments)     PATIENT DOESN'T KNOW    Lisinopril Other (See Comments)     FATIGUE      Norvasc [Amlodipine Besylate] Other (See Comments)     PATIENT DOESN'T KNOW

## 2025-04-28 ENCOUNTER — HOSPITAL ENCOUNTER (OUTPATIENT)
Dept: PHYSICAL THERAPY | Age: 80
Setting detail: THERAPIES SERIES
Discharge: HOME | End: 2025-04-28
Payer: MEDICARE

## 2025-04-28 PROCEDURE — 97110 THERAPEUTIC EXERCISES: CPT

## 2025-04-30 ENCOUNTER — HOSPITAL ENCOUNTER (OUTPATIENT)
Dept: PHYSICAL THERAPY | Age: 80
Setting detail: THERAPIES SERIES
Discharge: HOME | End: 2025-04-30
Payer: MEDICARE

## 2025-04-30 PROCEDURE — 97530 THERAPEUTIC ACTIVITIES: CPT

## 2025-04-30 NOTE — PROGRESS NOTES
increase B LE strength to 4+   Pt to perform 5 reps sit to stand without use of arms in 30 sec MET    Pt to walk for 12 feet and turn head 2 time to the right without path deviaton  MET  see long term goal s  Pt to stand on foam for 30 sec with feet together without LOB  MET  see long term goals  Pt to walk  with cane and supervision in the clinic  MET  see long term goals       Long Term Goals: 10 weeks  B LE strength 5/5 to allow for walking up and down steps to basement in order to do laundry and shower   Pt to stand and turn head 10 times without LOB or UE support  PT to walk for  40 feet and with cane or no device without path deviation  Pt with show no loss in balacne with 10 reps sit to stand without UE hold   I HEP       Patient Education:   []  HEP/Education Completed: Plan of Care, Goals, standing exercise  [facility] Access Code:  []  No new Education completed  [x]  Reviewed Prior HEP      [x]  Patient verbalized and/or demonstrated understanding of education provided.  []  Patient unable to verbalize and/or demonstrate understanding of education provided.  Will continue education.  []  Barriers to learning:     PLAN:  Treatment Recommendations: Strengthening, Range of Motion, Balance Training, Functional Mobility Training, Transfer Training, Gait Training, Neuromuscular Re-education, Home Exercise Program, Patient Education, and Safety Education and Training    []  Plan of care initiated.  Plan to see patient 2 times per week for 10 weeks to address the treatment planned outlined above.  [x]  Continue with current plan of care  []  Modify plan of care as follows:    []  Hold pending physician visit  []  Discharge    Time In 1127   Time Out 1157   Timed Code Minutes: 30 min   Total Treatment Time: 30 min       Electronically Signed by: Nixon Gonzalez PTA

## 2025-05-06 ENCOUNTER — HOSPITAL ENCOUNTER (OUTPATIENT)
Dept: PHYSICAL THERAPY | Age: 80
Setting detail: THERAPIES SERIES
Discharge: HOME | End: 2025-05-06
Payer: MEDICARE

## 2025-05-06 PROCEDURE — 97530 THERAPEUTIC ACTIVITIES: CPT

## 2025-05-06 NOTE — PROGRESS NOTES
[facility]  PHYSICAL THERAPY  [] EVALUATION  [x] DAILY NOTE (LAND) [] DAILY NOTE (AQUATIC ) [] PROGRESS NOTE [] DISCHARGE NOTE    [] OUTPATIENT REHABILITATION CENTER [facility]   [] [place] [facility]    [] [facility]   [x] [place] [facility]    Date: 2025  Patient Name:  Margarita Velasco  : 1945  MRN: [id number]  CSN: [id number]    Referring Practitioner Madiha Matamoros PA [phone]      Diagnosis  Diagnoses       R42 (ICD-10-CM) - Dizziness and giddiness    Z98.890 (ICD-10-CM) - Other specified postprocedural states    I10 (ICD-10-CM) - Essential (primary) hypertension           Treatment Diagnosis R26.81  Unsteadiness on feet  R26.89  Abnormalities of gait and mobility  R42   Dizziness and giddiness  R53.1 Weakness   Date of Evaluation 3/17/25   Additional Pertinent History Margarita Velasco has a past medical history of Anxiety, Carotid artery stenosis, Hypertension, and Type 2 diabetes mellitus without complication (HCC).  she has a past surgical history that includes Cholecystectomy; Carotid endarterectomy (Left, 2017); cervical fusion; Tonsillectomy and adenoidectomy; Tubal ligation; Radiofrequency ablation (Bilateral, ); and Dilation and curettage of uterus (N/A, 2023).  Denies hip and knee problems,    Allergies Allergies   Allergen Reactions    Cefdinir Nausea Only     AND ABDOMINAL PAIN    Ciprofloxacin Other (See Comments)     ABDOMINAL PAIN    Codeine Other (See Comments)     CHEST PAIN    Amoxicillin Other (See Comments)     PT DOESN'T KNOW    Chlordiazepoxide Hcl Other (See Comments)     PATIENT DOESN'T KNOW    Claritin [Loratadine] Other (See Comments)    Crestor [Rosuvastatin Calcium] Nausea Only     & TURNS URINE YELLOW    Flonase [Fluticasone Propionate] Other (See Comments)     PATIENT DOESN'T KNOW    Lisinopril Other (See Comments)     FATIGUE      Norvasc [Amlodipine Besylate] Other (See Comments)     PATIENT DOESN'T KNOW

## 2025-05-08 ENCOUNTER — HOSPITAL ENCOUNTER (OUTPATIENT)
Dept: PHYSICAL THERAPY | Age: 80
Setting detail: THERAPIES SERIES
Discharge: HOME | End: 2025-05-08
Payer: MEDICARE

## 2025-05-08 ENCOUNTER — APPOINTMENT (OUTPATIENT)
Dept: PHYSICAL THERAPY | Age: 80
End: 2025-05-08
Payer: MEDICARE

## 2025-05-08 PROCEDURE — 97530 THERAPEUTIC ACTIVITIES: CPT

## 2025-05-08 PROCEDURE — 97110 THERAPEUTIC EXERCISES: CPT

## 2025-05-08 NOTE — PROGRESS NOTES
[facility]  PHYSICAL THERAPY  [] EVALUATION  [x] DAILY NOTE (LAND) [] DAILY NOTE (AQUATIC ) [] PROGRESS NOTE [] DISCHARGE NOTE    [] OUTPATIENT REHABILITATION CENTER [facility]   [] [place] [facility]    [] [facility]   [x] [place] [facility]    Date: 2025  Patient Name:  Margarita Velasco  : 1945  MRN: [id number]  CSN: [id number]    Referring Practitioner Madiha Matamoros PA [phone]      Diagnosis  Diagnoses       R42 (ICD-10-CM) - Dizziness and giddiness    Z98.890 (ICD-10-CM) - Other specified postprocedural states    I10 (ICD-10-CM) - Essential (primary) hypertension           Treatment Diagnosis R26.81  Unsteadiness on feet  R26.89  Abnormalities of gait and mobility  R42   Dizziness and giddiness  R53.1 Weakness   Date of Evaluation 3/17/25   Additional Pertinent History Margarita Velasco has a past medical history of Anxiety, Carotid artery stenosis, Hypertension, and Type 2 diabetes mellitus without complication (HCC).  she has a past surgical history that includes Cholecystectomy; Carotid endarterectomy (Left, 2017); cervical fusion; Tonsillectomy and adenoidectomy; Tubal ligation; Radiofrequency ablation (Bilateral, ); and Dilation and curettage of uterus (N/A, 2023).  Denies hip and knee problems,    Allergies Allergies   Allergen Reactions    Cefdinir Nausea Only     AND ABDOMINAL PAIN    Ciprofloxacin Other (See Comments)     ABDOMINAL PAIN    Codeine Other (See Comments)     CHEST PAIN    Amoxicillin Other (See Comments)     PT DOESN'T KNOW    Chlordiazepoxide Hcl Other (See Comments)     PATIENT DOESN'T KNOW    Claritin [Loratadine] Other (See Comments)    Crestor [Rosuvastatin Calcium] Nausea Only     & TURNS URINE YELLOW    Flonase [Fluticasone Propionate] Other (See Comments)     PATIENT DOESN'T KNOW    Lisinopril Other (See Comments)     FATIGUE      Norvasc [Amlodipine Besylate] Other (See Comments)     PATIENT DOESN'T KNOW

## 2025-05-13 ENCOUNTER — APPOINTMENT (OUTPATIENT)
Dept: PHYSICAL THERAPY | Age: 80
End: 2025-05-13
Payer: MEDICARE

## 2025-05-16 ENCOUNTER — HOSPITAL ENCOUNTER (OUTPATIENT)
Dept: PHYSICAL THERAPY | Age: 80
Setting detail: THERAPIES SERIES
Discharge: HOME | End: 2025-05-16
Payer: MEDICARE

## 2025-05-16 PROCEDURE — 97110 THERAPEUTIC EXERCISES: CPT

## 2025-05-16 PROCEDURE — 97530 THERAPEUTIC ACTIVITIES: CPT

## 2025-05-16 NOTE — PROGRESS NOTES
[facility]  PHYSICAL THERAPY  [] EVALUATION  [x] DAILY NOTE (LAND) [] DAILY NOTE (AQUATIC ) [] PROGRESS NOTE [] DISCHARGE NOTE    [] OUTPATIENT REHABILITATION CENTER [facility]   [] [place] [facility]    [] [facility]   [x] [place] [facility]    Date: 2025  Patient Name:  Margarita Velasco  : 1945  MRN: [id number]  CSN: [id number]    Referring Practitioner Madiha Matamoros PA [phone]      Diagnosis  Diagnoses       R42 (ICD-10-CM) - Dizziness and giddiness    Z98.890 (ICD-10-CM) - Other specified postprocedural states    I10 (ICD-10-CM) - Essential (primary) hypertension           Treatment Diagnosis R26.81  Unsteadiness on feet  R26.89  Abnormalities of gait and mobility  R42   Dizziness and giddiness  R53.1 Weakness   Date of Evaluation 3/17/25   Additional Pertinent History Margarita Velasco has a past medical history of Anxiety, Carotid artery stenosis, Hypertension, and Type 2 diabetes mellitus without complication (HCC).  she has a past surgical history that includes Cholecystectomy; Carotid endarterectomy (Left, 2017); cervical fusion; Tonsillectomy and adenoidectomy; Tubal ligation; Radiofrequency ablation (Bilateral, ); and Dilation and curettage of uterus (N/A, 2023).  Denies hip and knee problems,    Allergies Allergies   Allergen Reactions    Cefdinir Nausea Only     AND ABDOMINAL PAIN    Ciprofloxacin Other (See Comments)     ABDOMINAL PAIN    Codeine Other (See Comments)     CHEST PAIN    Amoxicillin Other (See Comments)     PT DOESN'T KNOW    Chlordiazepoxide Hcl Other (See Comments)     PATIENT DOESN'T KNOW    Claritin [Loratadine] Other (See Comments)    Crestor [Rosuvastatin Calcium] Nausea Only     & TURNS URINE YELLOW    Flonase [Fluticasone Propionate] Other (See Comments)     PATIENT DOESN'T KNOW    Lisinopril Other (See Comments)     FATIGUE      Norvasc [Amlodipine Besylate] Other (See Comments)     PATIENT DOESN'T KNOW

## 2025-05-20 ENCOUNTER — HOSPITAL ENCOUNTER (OUTPATIENT)
Dept: PHYSICAL THERAPY | Age: 80
Setting detail: THERAPIES SERIES
Discharge: HOME | End: 2025-05-20
Payer: MEDICARE

## 2025-05-20 ENCOUNTER — HOSPITAL ENCOUNTER (OUTPATIENT)
Dept: PHYSICAL THERAPY | Age: 80
Setting detail: THERAPIES SERIES
End: 2025-05-20
Payer: MEDICARE

## 2025-05-20 PROCEDURE — 97530 THERAPEUTIC ACTIVITIES: CPT

## 2025-05-20 PROCEDURE — 97110 THERAPEUTIC EXERCISES: CPT

## 2025-05-23 ENCOUNTER — APPOINTMENT (OUTPATIENT)
Dept: PHYSICAL THERAPY | Age: 80
End: 2025-05-23
Payer: MEDICARE

## 2025-05-28 ENCOUNTER — APPOINTMENT (OUTPATIENT)
Dept: PHYSICAL THERAPY | Age: 80
End: 2025-05-28
Payer: MEDICARE

## 2025-06-04 ENCOUNTER — HOSPITAL ENCOUNTER (OUTPATIENT)
Dept: PHYSICAL THERAPY | Age: 80
Setting detail: THERAPIES SERIES
Discharge: HOME | End: 2025-06-04
Payer: MEDICARE

## 2025-06-04 PROCEDURE — 97110 THERAPEUTIC EXERCISES: CPT

## (undated) DEVICE — GAUZE,SPONGE,8"X4",12PLY,XRAY,STRL,LF: Brand: MEDLINE

## (undated) DEVICE — SOLUTION SCRB 4OZ 4% CHG H2O AIDED FOR PREOPERATIVE SKIN

## (undated) DEVICE — PAD,NON-ADHERENT,3X8,STERILE,LF,1/PK: Brand: MEDLINE

## (undated) DEVICE — Y-TYPE TUR/BLADDER IRRIGATION SET, REGULATING CLAMP

## (undated) DEVICE — GLOVE ORANGE PI 7   MSG9070

## (undated) DEVICE — LIQUIBAND RAPID ADHESIVE 36/CS 0.8ML: Brand: MEDLINE

## (undated) DEVICE — LITHOTOMY: Brand: MEDLINE INDUSTRIES, INC.

## (undated) DEVICE — PAD,SANITARY,11 IN,MAXI,W/WINGS,N-STRL: Brand: MEDLINE

## (undated) DEVICE — 35 ML SYRINGE LUER-LOCK TIP: Brand: MONOJECT

## (undated) DEVICE — DRAPE,UNDERBUTTOCKS,PCH,STERILE: Brand: MEDLINE

## (undated) DEVICE — TUBING, SUCTION, 1/4" X 20', STRAIGHT: Brand: MEDLINE INDUSTRIES, INC.

## (undated) DEVICE — GOWN,SIRUS,NON REINFRCD,LARGE,SET IN SL: Brand: MEDLINE

## (undated) DEVICE — PENCIL SMK EVAC ALL IN 1 DSGN ENH VISIBILITY IMPROVED AIR

## (undated) DEVICE — PACK,LAPAROSCOPY,PK II,SIRUS: Brand: MEDLINE

## (undated) DEVICE — SOLUTION SURG PREP SCRUB POV IOD 7.5% 4 OZ

## (undated) DEVICE — GLOVE SURG SZ 65 THK91MIL LTX FREE SYN POLYISOPRENE

## (undated) DEVICE — PREMIUM DRY TRAY LF: Brand: MEDLINE INDUSTRIES, INC.

## (undated) DEVICE — DRAPE GYN LAPSCP UROLOGY CLR DISP STRL OCVD CLRVIEWDRP